# Patient Record
Sex: FEMALE | Race: WHITE | NOT HISPANIC OR LATINO | Employment: FULL TIME
[De-identification: names, ages, dates, MRNs, and addresses within clinical notes are randomized per-mention and may not be internally consistent; named-entity substitution may affect disease eponyms.]

---

## 2018-12-19 ENCOUNTER — TRANSCRIBE ORDERS (OUTPATIENT)
Dept: SCHEDULING | Age: 33
End: 2018-12-19

## 2018-12-19 DIAGNOSIS — N92.6 IRREGULAR MENSTRUATION: Primary | ICD-10-CM

## 2018-12-21 ENCOUNTER — HOSPITAL ENCOUNTER (OUTPATIENT)
Dept: RADIOLOGY | Facility: HOSPITAL | Age: 33
Discharge: HOME | End: 2018-12-21
Attending: OBSTETRICS & GYNECOLOGY
Payer: COMMERCIAL

## 2018-12-21 DIAGNOSIS — N92.6 IRREGULAR MENSTRUATION: ICD-10-CM

## 2018-12-21 PROCEDURE — 76830 TRANSVAGINAL US NON-OB: CPT

## 2019-12-17 ENCOUNTER — APPOINTMENT (OUTPATIENT)
Dept: RADIOLOGY | Age: 34
End: 2019-12-17
Attending: FAMILY MEDICINE
Payer: COMMERCIAL

## 2019-12-17 ENCOUNTER — HOSPITAL ENCOUNTER (OUTPATIENT)
Facility: CLINIC | Age: 34
Discharge: HOME | End: 2019-12-17
Attending: FAMILY MEDICINE
Payer: COMMERCIAL

## 2019-12-17 VITALS
DIASTOLIC BLOOD PRESSURE: 60 MMHG | RESPIRATION RATE: 16 BRPM | TEMPERATURE: 98.9 F | SYSTOLIC BLOOD PRESSURE: 110 MMHG | HEART RATE: 78 BPM

## 2019-12-17 DIAGNOSIS — S80.02XA CONTUSION OF LEFT KNEE, INITIAL ENCOUNTER: Primary | ICD-10-CM

## 2019-12-17 PROCEDURE — S9088 SERVICES PROVIDED IN URGENT: HCPCS | Performed by: FAMILY MEDICINE

## 2019-12-17 PROCEDURE — 99213 OFFICE O/P EST LOW 20 MIN: CPT | Performed by: FAMILY MEDICINE

## 2019-12-17 PROCEDURE — 73564 X-RAY EXAM KNEE 4 OR MORE: CPT | Mod: LT | Performed by: FAMILY MEDICINE

## 2019-12-17 RX ORDER — KETOROLAC TROMETHAMINE 30 MG/ML
30 INJECTION, SOLUTION INTRAMUSCULAR; INTRAVENOUS ONCE
Status: COMPLETED | OUTPATIENT
Start: 2019-12-17 | End: 2019-12-17

## 2019-12-17 RX ADMIN — KETOROLAC TROMETHAMINE 30 MG: 30 INJECTION, SOLUTION INTRAMUSCULAR; INTRAVENOUS at 17:49

## 2019-12-17 NOTE — DISCHARGE INSTRUCTIONS
Your Xray did not reveal any fractures  Your contusion may take up to 1-2 weeks to heal  Use ice for 15-20 minutes to painful area 4-6 times a day to help with swelling and pain  Use Advil 600-800mg every 6-8 hours with food for pain  You receive Ketorolac 30mg IM in the office tonight.   Elevate your leg as much as possible  F/U with PCP as needed if you are not improving

## 2019-12-17 NOTE — ED PROVIDER NOTES
History  No chief complaint on file.    She fell last night onto her knees.  She injured her left knee.  Pain has increased throughout the day.  She is having trouble walking and flexing her knee.  It is better when it is straight.   She took some Advil earlier today  She fractured one of her patellas in 8th grade.  She thinks it might have been the right one but she is not sure.          No past medical history on file.    No past surgical history on file.    No family history on file.    Social History     Tobacco Use   • Smoking status: Not on file   Substance Use Topics   • Alcohol use: Not on file   • Drug use: Not on file       Review of Systems   Musculoskeletal: Positive for gait problem (Due to left knee pain).       Physical Exam  ED Triage Vitals [12/17/19 1726]   Temp Heart Rate Resp BP SpO2   37.2 °C (98.9 °F) 78 16 110/60 --      Temp src Heart Rate Source Patient Position BP Location FiO2 (%) (Set)   -- -- Sitting Left upper arm --       Physical Exam   Constitutional: She appears well-developed and well-nourished. No distress.   Musculoskeletal: She exhibits tenderness. She exhibits no edema or deformity.   Right knee with full nontender ROM.  No pain over patella. Ligaments of right knee all normal  Left knee with 3 inch ecchymotic area over patella and infrapatellar area  Ligament testing of left knee all normal   Area over patella is very tender  No significant joint effusion noted   Psychiatric: She has a normal mood and affect. Her behavior is normal.   Nursing note and vitals reviewed.        Procedures  Procedures    UC Course  Clinical Impressions as of Dec 17 1757   Contusion of left knee, initial encounter       MDM  Number of Diagnoses or Management Options  Contusion of left knee, initial encounter:   Diagnosis management comments: Left knee contusion  Xray negative  Advil, Ice,elevation                 Nestor Cristina MD  12/17/19 6908

## 2021-01-15 ENCOUNTER — IMMUNIZATIONS (OUTPATIENT)
Dept: FAMILY MEDICINE CLINIC | Facility: HOSPITAL | Age: 36
End: 2021-01-15

## 2021-01-15 DIAGNOSIS — Z23 ENCOUNTER FOR IMMUNIZATION: Primary | ICD-10-CM

## 2021-01-15 PROCEDURE — 0001A SARS-COV-2 / COVID-19 MRNA VACCINE (PFIZER-BIONTECH) 30 MCG: CPT

## 2021-01-15 PROCEDURE — 91300 SARS-COV-2 / COVID-19 MRNA VACCINE (PFIZER-BIONTECH) 30 MCG: CPT

## 2021-02-04 ENCOUNTER — IMMUNIZATIONS (OUTPATIENT)
Dept: FAMILY MEDICINE CLINIC | Facility: HOSPITAL | Age: 36
End: 2021-02-04

## 2021-02-04 DIAGNOSIS — Z23 ENCOUNTER FOR IMMUNIZATION: Primary | ICD-10-CM

## 2021-02-04 PROCEDURE — 91300 SARS-COV-2 / COVID-19 MRNA VACCINE (PFIZER-BIONTECH) 30 MCG: CPT

## 2021-02-04 PROCEDURE — 0002A SARS-COV-2 / COVID-19 MRNA VACCINE (PFIZER-BIONTECH) 30 MCG: CPT

## 2022-02-10 ENCOUNTER — ANESTHESIA EVENT (OUTPATIENT)
Dept: OPERATING ROOM | Facility: HOSPITAL | Age: 37
Setting detail: HOSPITAL OUTPATIENT SURGERY
End: 2022-02-10
Payer: COMMERCIAL

## 2022-02-10 ENCOUNTER — HOSPITAL ENCOUNTER (OUTPATIENT)
Dept: PERINATAL CARE | Facility: HOSPITAL | Age: 37
Discharge: HOME | End: 2022-02-10
Attending: NURSE PRACTITIONER
Payer: COMMERCIAL

## 2022-02-10 ENCOUNTER — TRANSCRIBE ORDERS (OUTPATIENT)
Dept: REGISTRATION | Facility: HOSPITAL | Age: 37
End: 2022-02-10

## 2022-02-10 ENCOUNTER — APPOINTMENT (OUTPATIENT)
Dept: PREADMISSION TESTING | Facility: HOSPITAL | Age: 37
End: 2022-02-10
Payer: COMMERCIAL

## 2022-02-10 ENCOUNTER — APPOINTMENT (OUTPATIENT)
Dept: LAB | Facility: HOSPITAL | Age: 37
End: 2022-02-10
Attending: STUDENT IN AN ORGANIZED HEALTH CARE EDUCATION/TRAINING PROGRAM
Payer: COMMERCIAL

## 2022-02-10 VITALS — BODY MASS INDEX: 20.4 KG/M2 | HEIGHT: 67 IN | WEIGHT: 130 LBS

## 2022-02-10 DIAGNOSIS — O02.1 ABORTION, MISSED: Primary | ICD-10-CM

## 2022-02-10 DIAGNOSIS — O02.1 MISSED AB: ICD-10-CM

## 2022-02-10 DIAGNOSIS — O02.1 MISSED AB: Primary | ICD-10-CM

## 2022-02-10 DIAGNOSIS — Z3A.01 LESS THAN 8 WEEKS GESTATION OF PREGNANCY: ICD-10-CM

## 2022-02-10 LAB
ABO + RH BLD: NORMAL
BLD GP AB SCN SERPL QL: NEGATIVE
D AG BLD QL: POSITIVE
ERYTHROCYTE [DISTWIDTH] IN BLOOD BY AUTOMATED COUNT: 11.6 % (ref 11.7–14.4)
HCT VFR BLDCO AUTO: 38.1 % (ref 35–45)
HGB BLD-MCNC: 12.9 G/DL (ref 11.8–15.7)
LABORATORY COMMENT REPORT: NORMAL
MCH RBC QN AUTO: 32.5 PG (ref 28–33.2)
MCHC RBC AUTO-ENTMCNC: 33.9 G/DL (ref 32.2–35.5)
MCV RBC AUTO: 96 FL (ref 83–98)
PDW BLD AUTO: 11.2 FL (ref 9.4–12.3)
PLATELET # BLD AUTO: 194 K/UL (ref 150–369)
RBC # BLD AUTO: 3.97 M/UL (ref 3.93–5.22)
SARS-COV-2 RNA RESP QL NAA+PROBE: NEGATIVE
SPECIMEN EXP DATE BLD: NORMAL
WBC # BLD AUTO: 5.19 K/UL (ref 3.8–10.5)

## 2022-02-10 PROCEDURE — C9803 HOPD COVID-19 SPEC COLLECT: HCPCS

## 2022-02-10 PROCEDURE — 85027 COMPLETE CBC AUTOMATED: CPT

## 2022-02-10 PROCEDURE — U0003 INFECTIOUS AGENT DETECTION BY NUCLEIC ACID (DNA OR RNA); SEVERE ACUTE RESPIRATORY SYNDROME CORONAVIRUS 2 (SARS-COV-2) (CORONAVIRUS DISEASE [COVID-19]), AMPLIFIED PROBE TECHNIQUE, MAKING USE OF HIGH THROUGHPUT TECHNOLOGIES AS DESCRIBED BY CMS-2020-01-R: HCPCS

## 2022-02-10 PROCEDURE — 86850 RBC ANTIBODY SCREEN: CPT

## 2022-02-10 PROCEDURE — 76817 TRANSVAGINAL US OBSTETRIC: CPT

## 2022-02-10 PROCEDURE — 86900 BLOOD TYPING SEROLOGIC ABO: CPT

## 2022-02-10 PROCEDURE — 86901 BLOOD TYPING SEROLOGIC RH(D): CPT

## 2022-02-10 PROCEDURE — 36415 COLL VENOUS BLD VENIPUNCTURE: CPT

## 2022-02-10 ASSESSMENT — PAIN SCALES - GENERAL: PAINLEVEL: 0-NO PAIN

## 2022-02-10 ASSESSMENT — ENCOUNTER SYMPTOMS: HEADACHES: 1

## 2022-02-10 NOTE — ANESTHESIOLOGIST PRE-PROCEDURE CHART REVIEW
I confirm that I have reviewed the available information in the medical record prior to the scheduled surgery. No COVID test on chart

## 2022-02-10 NOTE — ANESTHESIA PREPROCEDURE EVALUATION
Anesthesia ROS/MED HX    Anesthesia History    Previous anesthetics  No history of anesthetic complications  Pulmonary - neg  Neuro/Psych    Headaches   Anxiety  Cardiovascular- neg   Covid19 Test Reviewed  Hematological - neg  GI/Hepatic- neg  Musculoskeletal- neg  Renal Disease- neg  Endo/Other  Body Habitus: Normal      Relevant Problems   No relevant active problems       Physical Exam    Airway   Mallampati: II   TM distance: >3 FB   Neck ROM: full  Cardiovascular - normal   Rhythm: regular   Rate: normalPulmonary - normal   clear to auscultation  Dental - normal        Anesthesia Plan    Plan: general    Technique: general LMA     Lines and Monitors: SACHIN   ASA 2  Anesthetic plan and risks discussed with: patient  Induction:    intravenous   Postop Plan:   Patient Disposition: phase II then home   Pain Management: IV analgesics

## 2022-02-10 NOTE — PRE-PROCEDURE INSTRUCTIONS
1. Admissions will call you with your arrival time on 02/10/2022 (day prior to surgery) between 2pm - 4pm. For questions about your arrival time, please call [phone].    2. Please report to the [place] campus in the [facility] on the day of your procedure. Enter the hospital through the [facility] (main entrance at [address], [place]). If you are parking in the [facility] [place], come to the ground floor of the garage and follow signs to the Main Hospital. Bring your insurance card and photo ID.     3. Please follow these fasting guidelines:  - STOP all solid food 8 hours prior to arrival.   - No more than 12oz of water is permitted and must STOP 2 HOURS prior to arrival to the hospital.     4. Early on the morning of the procedure, please take the following medications listed below with a sip of water, in addition to any medications prescribed by your surgeon:     *NO aspirin, ibuprofen, anti-inflammatories, fish oil or Vitamin E unless ordered by physician.    *Stop taking      5. Other instructions: antibacterial shower x 2, brush teeth    6. If you develop a cough, cold, fever, or other symptom prior to the date of the procedure, please report it to your physician immediately.    7. If you need to cancel the procedure for any reason, please contact your physician.    8. Make arrangements to have someone drive you home from the procedure. If you have not arranged for transportation home, your surgery may be cancelled.     9. You may not take public transportation or a cab unless accompanied by a responsible person.    10. You may not drive a car or operate complex or potentially dangerous machinery for 24 hours following anesthesia and/or sedation.    11. If it is medically necessary for you to have a longer stay, you will be informed as soon as the decision is made.    12. Do not wear or bring anything of value to the hospital, including medication or jewelry of any kind. Do  not wear make-up or contact lenses. Do bring your glasses and hearing aid, with a case. If you use a CPAP machine and will be overnight, please bring it with you. If you use any inhalers, please bring them as well.     13. Dress in comfortable clothes.    14. If instructed, please bring a copy of your Advance Directive (Living Will/Durable Power of ) on the day of your procedure.    15. Ensuring your safety at all times is a very important part of our [facility] Culture of Safety. After having surgery and sedation, you are at risk for falling and balance issues. Although you may feel awake, the effects of the medication can last up to 24 hours after anesthesia. If you need to use the bathroom during your recovery period, nursing staff will escort you there and stay with you to ensure your safety.    Pre operative instructions given as per protocol.  Form explained by:     Bereavement phone number offered to pt.  Pt refused Bereavement phone number.

## 2022-02-11 ENCOUNTER — HOSPITAL ENCOUNTER (OUTPATIENT)
Facility: HOSPITAL | Age: 37
Setting detail: HOSPITAL OUTPATIENT SURGERY
Discharge: HOME | End: 2022-02-11
Attending: OBSTETRICS & GYNECOLOGY | Admitting: OBSTETRICS & GYNECOLOGY
Payer: COMMERCIAL

## 2022-02-11 ENCOUNTER — ANESTHESIA (OUTPATIENT)
Dept: OPERATING ROOM | Facility: HOSPITAL | Age: 37
Setting detail: HOSPITAL OUTPATIENT SURGERY
End: 2022-02-11
Payer: COMMERCIAL

## 2022-02-11 VITALS
SYSTOLIC BLOOD PRESSURE: 100 MMHG | DIASTOLIC BLOOD PRESSURE: 57 MMHG | OXYGEN SATURATION: 100 % | HEART RATE: 57 BPM | TEMPERATURE: 99 F | RESPIRATION RATE: 16 BRPM

## 2022-02-11 DIAGNOSIS — O02.1 MISSED AB: ICD-10-CM

## 2022-02-11 PROCEDURE — 37000001 HC ANESTHESIA GENERAL: Performed by: OBSTETRICS & GYNECOLOGY

## 2022-02-11 PROCEDURE — 71000002 HC PACU PHASE 2 INITIAL 30MIN: Performed by: OBSTETRICS & GYNECOLOGY

## 2022-02-11 PROCEDURE — 71000012 HC PACU PHASE 2 EA ADDL MIN: Performed by: OBSTETRICS & GYNECOLOGY

## 2022-02-11 PROCEDURE — 88305 TISSUE EXAM BY PATHOLOGIST: CPT | Performed by: OBSTETRICS & GYNECOLOGY

## 2022-02-11 PROCEDURE — 25800000 HC PHARMACY IV SOLUTIONS: Performed by: NURSE ANESTHETIST, CERTIFIED REGISTERED

## 2022-02-11 PROCEDURE — 36000002 HC OR LEVEL 2 INITIAL 30MIN: Performed by: OBSTETRICS & GYNECOLOGY

## 2022-02-11 PROCEDURE — 71000001 HC PACU PHASE 1 INITIAL 30MIN: Performed by: OBSTETRICS & GYNECOLOGY

## 2022-02-11 PROCEDURE — 63600000 HC DRUGS/DETAIL CODE: Performed by: NURSE ANESTHETIST, CERTIFIED REGISTERED

## 2022-02-11 PROCEDURE — 71000011 HC PACU PHASE 1 EA ADDL MIN: Performed by: OBSTETRICS & GYNECOLOGY

## 2022-02-11 PROCEDURE — 63600000 HC DRUGS/DETAIL CODE: Performed by: ANESTHESIOLOGY

## 2022-02-11 PROCEDURE — 25000000 HC PHARMACY GENERAL: Performed by: NURSE ANESTHETIST, CERTIFIED REGISTERED

## 2022-02-11 PROCEDURE — 10D17ZZ EXTRACTION OF PRODUCTS OF CONCEPTION, RETAINED, VIA NATURAL OR ARTIFICIAL OPENING: ICD-10-PCS | Performed by: OBSTETRICS & GYNECOLOGY

## 2022-02-11 RX ORDER — OXYCODONE HYDROCHLORIDE 5 MG/1
5 TABLET ORAL EVERY 4 HOURS PRN
Status: CANCELLED | OUTPATIENT
Start: 2022-02-11

## 2022-02-11 RX ORDER — LABETALOL HCL 20 MG/4 ML
5 SYRINGE (ML) INTRAVENOUS AS NEEDED
Status: DISCONTINUED | OUTPATIENT
Start: 2022-02-11 | End: 2022-02-11 | Stop reason: HOSPADM

## 2022-02-11 RX ORDER — FENTANYL CITRATE 50 UG/ML
50 INJECTION, SOLUTION INTRAMUSCULAR; INTRAVENOUS
Status: DISCONTINUED | OUTPATIENT
Start: 2022-02-11 | End: 2022-02-11 | Stop reason: HOSPADM

## 2022-02-11 RX ORDER — SODIUM CHLORIDE 9 MG/ML
INJECTION, SOLUTION INTRAVENOUS CONTINUOUS PRN
Status: DISCONTINUED | OUTPATIENT
Start: 2022-02-11 | End: 2022-02-11 | Stop reason: SURG

## 2022-02-11 RX ORDER — DOXYCYCLINE HYCLATE 100 MG
200 TABLET ORAL ONCE
Status: COMPLETED | OUTPATIENT
Start: 2022-02-11 | End: 2022-02-11

## 2022-02-11 RX ORDER — ONDANSETRON HYDROCHLORIDE 2 MG/ML
4 INJECTION, SOLUTION INTRAVENOUS
Status: DISCONTINUED | OUTPATIENT
Start: 2022-02-11 | End: 2022-02-11 | Stop reason: HOSPADM

## 2022-02-11 RX ORDER — DEXTROSE 50 % IN WATER (D50W) INTRAVENOUS SYRINGE
25 AS NEEDED
Status: CANCELLED | OUTPATIENT
Start: 2022-02-11

## 2022-02-11 RX ORDER — DEXTROSE 40 %
15-30 GEL (GRAM) ORAL AS NEEDED
Status: CANCELLED | OUTPATIENT
Start: 2022-02-11

## 2022-02-11 RX ORDER — PROPOFOL 10 MG/ML
INJECTION, EMULSION INTRAVENOUS CONTINUOUS PRN
Status: DISCONTINUED | OUTPATIENT
Start: 2022-02-11 | End: 2022-02-11 | Stop reason: SURG

## 2022-02-11 RX ORDER — FENTANYL CITRATE 50 UG/ML
INJECTION, SOLUTION INTRAMUSCULAR; INTRAVENOUS AS NEEDED
Status: DISCONTINUED | OUTPATIENT
Start: 2022-02-11 | End: 2022-02-11 | Stop reason: SURG

## 2022-02-11 RX ORDER — ONDANSETRON 4 MG/1
4 TABLET, ORALLY DISINTEGRATING ORAL EVERY 8 HOURS PRN
Status: CANCELLED | OUTPATIENT
Start: 2022-02-11 | End: 2022-05-12

## 2022-02-11 RX ORDER — IBUPROFEN 400 MG/1
400 TABLET ORAL EVERY 6 HOURS PRN
Status: CANCELLED | OUTPATIENT
Start: 2022-02-11

## 2022-02-11 RX ORDER — IBUPROFEN 200 MG
16-32 TABLET ORAL AS NEEDED
Status: CANCELLED | OUTPATIENT
Start: 2022-02-11

## 2022-02-11 RX ORDER — ONDANSETRON HYDROCHLORIDE 2 MG/ML
INJECTION, SOLUTION INTRAVENOUS AS NEEDED
Status: DISCONTINUED | OUTPATIENT
Start: 2022-02-11 | End: 2022-02-11 | Stop reason: SURG

## 2022-02-11 RX ORDER — KETOROLAC TROMETHAMINE 15 MG/ML
INJECTION, SOLUTION INTRAMUSCULAR; INTRAVENOUS AS NEEDED
Status: DISCONTINUED | OUTPATIENT
Start: 2022-02-11 | End: 2022-02-11 | Stop reason: SURG

## 2022-02-11 RX ORDER — HYDROMORPHONE HYDROCHLORIDE 1 MG/ML
0.5 INJECTION, SOLUTION INTRAMUSCULAR; INTRAVENOUS; SUBCUTANEOUS
Status: DISCONTINUED | OUTPATIENT
Start: 2022-02-11 | End: 2022-02-11 | Stop reason: HOSPADM

## 2022-02-11 RX ORDER — LIDOCAINE HYDROCHLORIDE 10 MG/ML
INJECTION, SOLUTION EPIDURAL; INFILTRATION; INTRACAUDAL; PERINEURAL AS NEEDED
Status: DISCONTINUED | OUTPATIENT
Start: 2022-02-11 | End: 2022-02-11 | Stop reason: SURG

## 2022-02-11 RX ORDER — MIDAZOLAM HYDROCHLORIDE 2 MG/2ML
INJECTION, SOLUTION INTRAMUSCULAR; INTRAVENOUS AS NEEDED
Status: DISCONTINUED | OUTPATIENT
Start: 2022-02-11 | End: 2022-02-11 | Stop reason: SURG

## 2022-02-11 RX ADMIN — KETOROLAC TROMETHAMINE 15 MG: 15 INJECTION, SOLUTION INTRAMUSCULAR; INTRAVENOUS at 11:10

## 2022-02-11 RX ADMIN — FENTANYL CITRATE 100 MCG: 50 INJECTION, SOLUTION INTRAMUSCULAR; INTRAVENOUS at 10:57

## 2022-02-11 RX ADMIN — MIDAZOLAM HYDROCHLORIDE 2 MG: 1 INJECTION, SOLUTION INTRAMUSCULAR; INTRAVENOUS at 10:50

## 2022-02-11 RX ADMIN — Medication 200 MG: at 12:31

## 2022-02-11 RX ADMIN — PROPOFOL 150 MCG/KG/MIN: 10 INJECTION, EMULSION INTRAVENOUS at 10:57

## 2022-02-11 RX ADMIN — LIDOCAINE HYDROCHLORIDE 2 ML: 10 INJECTION, SOLUTION EPIDURAL; INFILTRATION; INTRACAUDAL; PERINEURAL at 10:57

## 2022-02-11 RX ADMIN — ONDANSETRON 4 MG: 2 INJECTION INTRAMUSCULAR; INTRAVENOUS at 11:05

## 2022-02-11 RX ADMIN — SODIUM CHLORIDE: 0.9 INJECTION, SOLUTION INTRAVENOUS at 10:53

## 2022-02-11 RX ADMIN — FENTANYL CITRATE 50 MCG: 50 INJECTION INTRAMUSCULAR; INTRAVENOUS at 11:28

## 2022-02-11 ASSESSMENT — PAIN SCALES - GENERAL: PAIN_LEVEL: 2

## 2022-02-11 NOTE — H&P
Gynecology History and Physical    HPI     Patient is a 36 y.o. female who presents with embryo demise at 7 weeks, confirmed by ultrasound..     OB History:   OB History    Para Term  AB Living   1 0 0 0 0 0   SAB IAB Ectopic Multiple Live Births   0 0 0 0 0      # Outcome Date GA Lbr Luis Alberto/2nd Weight Sex Delivery Anes PTL Lv   1 Current                Medical History:   Past Medical History:   Diagnosis Date   • Anxiety    • H/O migraine    • UTI (urinary tract infection)        Surgical History:   Past Surgical History:   Procedure Laterality Date   • DILATION AND CURETTAGE, DIAGNOSTIC / THERAPEUTIC     • WISDOM TOOTH EXTRACTION         Social History:   Social History     Social History Narrative   • Not on file       Family History: No family history on file.    Allergies: Patient has no known allergies.    Prior to Admission medications    Not on File       Review of Systems  Pertinent items are noted in HPI.    Objective     Vital Signs for the last 24 hours:       Exam  General appearance: alert, appears stated age and cooperative  Lungs: clear to auscultation bilaterally  Heart: regular rate and rhythm, S1, S2 normal, no murmur, click, rub or gallop  Abdomen: soft, non-tender; bowel sounds normal; no masses, no organomegaly    Labs  I have reviewed the patient's labs.  Current labs are within normal limits.    Imaging  I have reviewed the Imaging from the last 24 hrs.        Assessment/Plan     Code Status: No Order    Missed  at 7 weeks  Consent for D&E    Sergei Black MD.

## 2022-02-11 NOTE — ANESTHESIOLOGIST PRE-PROCEDURE ATTESTATION
Pre-Procedure Patient Identification:  I am the Primary Anesthesiologist and have identified the patient on 02/11/22 at 10:32 AM.   I have confirmed the procedure(s) will be performed by the following surgeon/proceduralist Sergei Black MD.

## 2022-02-11 NOTE — OP NOTE
D&E Procedure Note     Name: Celine Engle MRN [id number]   YOB: 1985   Date: 2022   Time: 12:15 PM   Pre-operative Diagnosis: missed    Post-operatIve Diagnosis: same     Surgeon(s) and Role:     * Sergei Black MD - Primary     Anesthesiologist: No responsible provider has been recorded for the case.   Anesthesia: General     Procedure: dilation and evacuation  Estimated Blood Loss: Minimal   Complications: none  Indication: missed  at 7 weeks   Findings: anteverted uterus, sound to 10 cm, moderate amount of tissue  Specimens:   ID Type Source Tests Collected by Time Destination   1 : products of conception Products of Conception Products of Conception PATHOLOGY TISSUE EXAM Sergei Black MD 2022 1059        Procedure in Detail:   The patient was taken to the operating room with her IV fluids running. The patient was identified and a timeout was performed. She was placed under general  anesthesia. She was positioned in dorsal lithotomy position. She was then prepped and draped in the normal sterile fashion. The anterior lip of the cervix was grasped with a single-tooth tenaculum, and the cervix was dilated . A 7mm straight curette was inserted into uterine cavity and evacuation was performed with moderate amount of tissue obtained. A medium sharp curettage was performed with no additional tissue.   Sponge, needle, and instrument counts were correct times two.    Disposition: PACU - hemodynamically stable.  Condition: stable    Attending Attestation: I performed the procedure.    Sergei Black MD

## 2022-02-11 NOTE — ANESTHESIA POSTPROCEDURE EVALUATION
Patient: Celine Engle    Procedure Summary     Date: 02/11/22 Room / Location: [facility] PAV OR 06 / [facility] OR [facility]    Anesthesia Start: 1053 Anesthesia Stop: 1121    Procedure: D&E 6W (N/A Uterus) Diagnosis:       Missed ab      (Missed ab [O02.1])    Surgeons: Sergei Black MD Responsible Provider: Tal Nuñez MD    Anesthesia Type: general ASA Status: 2          Anesthesia Type: general  PACU Vitals  2/11/2022 1114 - 2/11/2022 1128      2/11/2022  1123 2/11/2022  1125          BP: -- 95/54      Temp: 36.6 °C (97.9 °F) --      Pulse: -- 57      Resp: -- 15              Anesthesia Post Evaluation    Pain score: 2  Pain management: satisfactory to patient  Mode of pain management: IV medication  Patient location during evaluation: PACU  Patient participation: complete - patient participated  Level of consciousness: awake  Cardiovascular status: acceptable  Airway Patency: adequate  Respiratory status: acceptable  Hydration status: stable  Anesthetic complications: no

## 2022-02-14 LAB
CASE RPRT: NORMAL
CLINICAL INFO: NORMAL
PATH REPORT.FINAL DX SPEC: NORMAL
PATH REPORT.GROSS SPEC: NORMAL

## 2022-02-19 ENCOUNTER — APPOINTMENT (EMERGENCY)
Dept: RADIOLOGY | Facility: HOSPITAL | Age: 37
End: 2022-02-19
Attending: EMERGENCY MEDICINE
Payer: COMMERCIAL

## 2022-02-19 ENCOUNTER — HOSPITAL ENCOUNTER (EMERGENCY)
Facility: HOSPITAL | Age: 37
Discharge: HOME | End: 2022-02-19
Attending: EMERGENCY MEDICINE
Payer: COMMERCIAL

## 2022-02-19 VITALS
DIASTOLIC BLOOD PRESSURE: 64 MMHG | BODY MASS INDEX: 20.4 KG/M2 | WEIGHT: 130 LBS | OXYGEN SATURATION: 100 % | TEMPERATURE: 98.8 F | HEIGHT: 67 IN | RESPIRATION RATE: 16 BRPM | SYSTOLIC BLOOD PRESSURE: 131 MMHG | HEART RATE: 79 BPM

## 2022-02-19 DIAGNOSIS — S30.0XXA CONTUSION OF COCCYX, INITIAL ENCOUNTER: Primary | ICD-10-CM

## 2022-02-19 LAB — B-HCG UR QL: POSITIVE

## 2022-02-19 PROCEDURE — 81025 URINE PREGNANCY TEST: CPT | Performed by: EMERGENCY MEDICINE

## 2022-02-19 PROCEDURE — 72220 X-RAY EXAM SACRUM TAILBONE: CPT

## 2022-02-19 PROCEDURE — 63700000 HC SELF-ADMINISTRABLE DRUG: Performed by: PHYSICIAN ASSISTANT

## 2022-02-19 PROCEDURE — 99283 EMERGENCY DEPT VISIT LOW MDM: CPT | Mod: 25

## 2022-02-19 RX ORDER — OXYCODONE AND ACETAMINOPHEN 5; 325 MG/1; MG/1
1 TABLET ORAL EVERY 6 HOURS PRN
Qty: 5 TABLET | Refills: 0 | Status: SHIPPED | OUTPATIENT
Start: 2022-02-19 | End: 2022-02-19

## 2022-02-19 RX ORDER — IBUPROFEN 600 MG/1
600 TABLET ORAL ONCE
Status: COMPLETED | OUTPATIENT
Start: 2022-02-19 | End: 2022-02-19

## 2022-02-19 RX ORDER — OXYCODONE AND ACETAMINOPHEN 5; 325 MG/1; MG/1
1 TABLET ORAL EVERY 4 HOURS PRN
Qty: 8 TABLET | Refills: 0 | Status: SHIPPED | OUTPATIENT
Start: 2022-02-19 | End: 2022-03-01

## 2022-02-19 RX ADMIN — IBUPROFEN 600 MG: 600 TABLET ORAL at 18:16

## 2022-02-19 ASSESSMENT — ENCOUNTER SYMPTOMS
ABDOMINAL PAIN: 0
HEADACHES: 0
BACK PAIN: 1
DIZZINESS: 0
NECK PAIN: 0

## 2022-02-19 NOTE — ED PROVIDER NOTES
Emergency Medicine Note  HPI   HISTORY OF PRESENT ILLNESS     36 year old female presents to the ER for evaluation. Pt fell while skiing today and landed on her tailbone. She has shooting pain when she sits down. Feels better when standing. No other injury sustained. She says she has a h/o sacral fx in the past and this feels similar. Pt took pain meds prior to arrival. No head neck chest abdominal or extremity trauma.       Trauma  Mechanism of injury: fall     Current symptoms:       Associated symptoms:             Reports back pain.             Denies abdominal pain, chest pain, headache and neck pain.   Back Pain  Associated symptoms: no abdominal pain, no chest pain and no headaches          Patient History   PAST HISTORY     Reviewed from Nursing Triage:       Past Medical History:   Diagnosis Date   • Anxiety    • H/O migraine    • UTI (urinary tract infection)        Past Surgical History:   Procedure Laterality Date   • DILATION AND CURETTAGE, DIAGNOSTIC / THERAPEUTIC     • WISDOM TOOTH EXTRACTION         History reviewed. No pertinent family history.    Social History     Tobacco Use   • Smoking status: Never Smoker   • Smokeless tobacco: Never Used   Vaping Use   • Vaping Use: Never used   Substance Use Topics   • Alcohol use: Not Currently   • Drug use: Not on file         Review of Systems   REVIEW OF SYSTEMS     Review of Systems   Cardiovascular: Negative for chest pain.   Gastrointestinal: Negative for abdominal pain.   Musculoskeletal: Positive for back pain. Negative for neck pain.   Neurological: Negative for dizziness and headaches.   All other systems reviewed and are negative.        VITALS     ED Vitals    Date/Time Temp Pulse Resp BP SpO2 [facility]   02/19/22 1516 37.1 °C (98.8 °F) 79 16 131/64 100 % GFC        Pulse Ox %: 100 % (02/19/22 1748)  Pulse Ox Interpretation: Normal (02/19/22 1748)           Physical Exam   PHYSICAL EXAM     Physical Exam  Constitutional:       General: She is not in  acute distress.     Appearance: Normal appearance. She is not ill-appearing, toxic-appearing or diaphoretic.   Pulmonary:      Effort: Pulmonary effort is normal.   Musculoskeletal:      Cervical back: Normal range of motion and neck supple.      Comments: Tenderness over sacrum   Skin:     General: Skin is warm and dry.   Neurological:      General: No focal deficit present.      Mental Status: She is alert and oriented to person, place, and time.           PROCEDURES     Procedures     DATA     Results     Procedure Component Value Units Date/Time    Urine, pregnancy (lab) [[id number]]  (Abnormal) Collected: 02/19/22 1540    Specimen: Urine, Clean Catch Updated: 02/19/22 1559     BHCG, Qualitative Urine Positive          Imaging Results          X-RAY SACRUM AND COCCYX (Final result)  Result time 02/19/22 17:30:40    Final result                 Impression:    IMPRESSION:    No acute osseous abnormality.             Narrative:    CLINICAL HISTORY: fall   .    TECHNIQUE: XR SACRUM AND COCCYX    COMMENT:    No evidence of acute fracture or dislocation. Soft tissues are unremarkable.                                  No orders to display       Scoring tools                                 ED Course & MDM   MDM / ED COURSE / CLINICAL IMPRESSIONS / DISPO     [facility]    ED Course as of 02/19/22 2009   Sat Feb 19, 2022   1748 Patient with positive pregnancy test, had a D and E last week []      ED Course User Index  [BH] Harjit Castro, DO         Clinical Impressions as of 02/19/22 2009   Contusion of coccyx, initial encounter              Paula Shrestha PA C  02/19/22 2009

## 2022-02-20 NOTE — ED ATTESTATION NOTE
I have personally seen and examined the patient.  I reviewed and agree with physician assistant / nurse practitioner’s assessment and plan of care, with the following exceptions: None  My examination, assessment, and plan of care of Celine Engle is as follows:     Exam: Well-appearing, no acute distress, awake alert oriented x3, patient able to ambulate without issues, tenderness to palpation to lower sacral area    Assessment and plan: Patient mechanical fall coming off a ski lift today, x-ray unremarkable, symptomatic treatment and outpatient follow-up     Harjit Castro, DO  02/20/22 0121

## 2022-04-06 ENCOUNTER — TELEPHONE (OUTPATIENT)
Dept: OBSTETRICS AND GYNECOLOGY | Facility: HOSPITAL | Age: 37
End: 2022-04-06
Payer: COMMERCIAL

## 2022-04-06 RX ORDER — NITROFURANTOIN 25; 75 MG/1; MG/1
100 CAPSULE ORAL 2 TIMES DAILY
Qty: 14 CAPSULE | Refills: 0 | Status: SHIPPED | OUTPATIENT
Start: 2022-04-06 | End: 2022-04-13

## 2022-04-14 ENCOUNTER — TRANSCRIBE ORDERS (OUTPATIENT)
Dept: SCHEDULING | Age: 37
End: 2022-04-14

## 2022-04-14 DIAGNOSIS — R39.15 URGENCY OF URINATION: Primary | ICD-10-CM

## 2022-04-26 ENCOUNTER — HOSPITAL ENCOUNTER (OUTPATIENT)
Dept: RADIOLOGY | Facility: CLINIC | Age: 37
Discharge: HOME | End: 2022-04-26
Attending: UROLOGY
Payer: COMMERCIAL

## 2022-04-26 DIAGNOSIS — R39.15 URGENCY OF URINATION: ICD-10-CM

## 2022-04-26 PROCEDURE — 76770 US EXAM ABDO BACK WALL COMP: CPT

## 2022-06-14 ENCOUNTER — TRANSCRIBE ORDERS (OUTPATIENT)
Dept: SCHEDULING | Age: 37
End: 2022-06-14

## 2022-06-14 DIAGNOSIS — R10.2 PELVIC AND PERINEAL PAIN: Primary | ICD-10-CM

## 2023-02-28 ENCOUNTER — INITIAL PRENATAL (OUTPATIENT)
Dept: OBSTETRICS AND GYNECOLOGY | Facility: CLINIC | Age: 38
End: 2023-02-28
Payer: COMMERCIAL

## 2023-02-28 VITALS
WEIGHT: 127 LBS | BODY MASS INDEX: 19.93 KG/M2 | DIASTOLIC BLOOD PRESSURE: 60 MMHG | SYSTOLIC BLOOD PRESSURE: 100 MMHG | HEIGHT: 67 IN

## 2023-02-28 DIAGNOSIS — Z34.01 ENCOUNTER FOR SUPERVISION OF NORMAL FIRST PREGNANCY IN FIRST TRIMESTER: Primary | ICD-10-CM

## 2023-02-28 PROCEDURE — 99203 OFFICE O/P NEW LOW 30 MIN: CPT | Performed by: OBSTETRICS & GYNECOLOGY

## 2023-02-28 PROCEDURE — 76815 OB US LIMITED FETUS(S): CPT | Performed by: OBSTETRICS & GYNECOLOGY

## 2023-03-07 ENCOUNTER — TELEPHONE (OUTPATIENT)
Dept: OBSTETRICS AND GYNECOLOGY | Facility: CLINIC | Age: 38
End: 2023-03-07
Payer: COMMERCIAL

## 2023-03-08 LAB
ABO GROUP BLD: NORMAL
BASOPHILS # BLD AUTO: 0 X10E3/UL (ref 0–0.2)
BASOPHILS NFR BLD AUTO: 0 %
BLD GP AB SCN SERPL QL: NEGATIVE
EOSINOPHIL # BLD AUTO: 0.1 X10E3/UL (ref 0–0.4)
EOSINOPHIL NFR BLD AUTO: 1 %
ERYTHROCYTE [DISTWIDTH] IN BLOOD BY AUTOMATED COUNT: 12.2 % (ref 11.7–15.4)
HCT VFR BLD AUTO: 36.5 % (ref 34–46.6)
HGB BLD-MCNC: 12.6 G/DL (ref 11.1–15.9)
IMM GRANULOCYTES # BLD AUTO: 0 X10E3/UL (ref 0–0.1)
IMM GRANULOCYTES NFR BLD AUTO: 0 %
LYMPHOCYTES # BLD AUTO: 1.6 X10E3/UL (ref 0.7–3.1)
LYMPHOCYTES NFR BLD AUTO: 27 %
MCH RBC QN AUTO: 31.6 PG (ref 26.6–33)
MCHC RBC AUTO-ENTMCNC: 34.5 G/DL (ref 31.5–35.7)
MCV RBC AUTO: 92 FL (ref 79–97)
MONOCYTES # BLD AUTO: 0.5 X10E3/UL (ref 0.1–0.9)
MONOCYTES NFR BLD AUTO: 8 %
NEUTROPHILS # BLD AUTO: 3.6 X10E3/UL (ref 1.4–7)
NEUTROPHILS NFR BLD AUTO: 64 %
PLATELET # BLD AUTO: 223 X10E3/UL (ref 150–450)
PROGEST SERPL-MCNC: 27.8 NG/ML
RBC # BLD AUTO: 3.99 X10E6/UL (ref 3.77–5.28)
RH BLD: POSITIVE
RUBV IGG SERPL IA-ACNC: 5.17 INDEX
T4 FREE SERPL-MCNC: 1.32 NG/DL (ref 0.82–1.77)
TREPONEMA PALLIDUM IGG+IGM AB [PRESENCE] IN SERUM OR PLASMA BY IMMUNOASSAY: NON REACTIVE
TSH SERPL DL<=0.005 MIU/L-ACNC: 0.15 UIU/ML (ref 0.45–4.5)
WBC # BLD AUTO: 5.8 X10E3/UL (ref 3.4–10.8)

## 2023-03-09 LAB
T4 SERPL-MCNC: 9.2 UG/DL (ref 4.5–12)
THYROPEROXIDASE AB SERPL-ACNC: <9 IU/ML (ref 0–34)

## 2023-03-10 LAB
SPECIMEN STATUS: NORMAL
TSH RECEP AB SER-ACNC: <1.1 IU/L (ref 0–1.75)

## 2023-03-12 LAB
CFDNA.FET/CFDNA.TOTAL SFR FETUS: NORMAL %
CITATION REF LAB TEST: NORMAL
FET 13+18+21+X+Y ANEUP PLAS.CFDNA: NEGATIVE
FET CHR 21 TS PLAS.CFDNA QL: NEGATIVE
FET SEX PLAS.CFDNA DOSAGE CFDNA: NORMAL
FET TS 13 RISK PLAS.CFDNA QL: NEGATIVE
FET TS 18 RISK WBC.DNA+CFDNA QL: NEGATIVE
GA EST FROM CONCEPTION DATE: NORMAL D
LAB CORP GESTATIONAL AGE: YES
LAB CORP POSITIVE PREDICTIVE VALUE: NORMAL
LAB CORP TEST LIMITATIONS: NORMAL
LAB DIRECTOR NAME PROVIDER: NORMAL
LAB DIRECTOR NAME PROVIDER: NORMAL
LABCORP NEGATIVE PREDICTIVE VALUE: NORMAL
LABCORP PERFORMANCE CHARACTERISTICS: NORMAL
LABORATORY COMMENT REPORT: NORMAL
Lab: NORMAL
REF LAB TEST METHOD: NORMAL
TEST PERFORMANCE INFO SPEC: NORMAL

## 2023-03-14 ENCOUNTER — TELEPHONE (OUTPATIENT)
Dept: OBSTETRICS AND GYNECOLOGY | Facility: CLINIC | Age: 38
End: 2023-03-14
Payer: COMMERCIAL

## 2023-03-14 LAB
CFTR MUT ANL BLD/T: NORMAL
LABORATORY COMMENT REPORT: NORMAL

## 2023-03-15 ENCOUNTER — ROUTINE PRENATAL (OUTPATIENT)
Dept: OBSTETRICS AND GYNECOLOGY | Facility: CLINIC | Age: 38
End: 2023-03-15
Payer: COMMERCIAL

## 2023-03-15 VITALS — DIASTOLIC BLOOD PRESSURE: 76 MMHG | SYSTOLIC BLOOD PRESSURE: 122 MMHG | WEIGHT: 129.2 LBS | BODY MASS INDEX: 20.24 KG/M2

## 2023-03-15 DIAGNOSIS — Z34.02 ENCOUNTER FOR SUPERVISION OF NORMAL FIRST PREGNANCY IN SECOND TRIMESTER: Primary | ICD-10-CM

## 2023-03-15 LAB
BLOOD URINE, POC: ABNORMAL
EXPIRATION DATE: ABNORMAL
GLUCOSE URINE, POC: NEGATIVE
KETONES, POC: NEGATIVE
LEUKOCYTE EST, POC: ABNORMAL
Lab: ABNORMAL
NITRITE, POC: NEGATIVE
POCT MANUFACTURER: ABNORMAL
PROTEIN, POC: NEGATIVE

## 2023-03-15 PROCEDURE — 0502F SUBSEQUENT PRENATAL CARE: CPT | Performed by: OBSTETRICS & GYNECOLOGY

## 2023-03-15 PROCEDURE — 81002 URINALYSIS NONAUTO W/O SCOPE: CPT | Performed by: OBSTETRICS & GYNECOLOGY

## 2023-03-15 PROCEDURE — 76815 OB US LIMITED FETUS(S): CPT | Performed by: OBSTETRICS & GYNECOLOGY

## 2023-03-15 NOTE — PRENATAL NOTE
Destiney is here today for her second OB visit 37-year-old  4 para 3 1 spontaneous miscarriage following 3 vaginal births on 3 boys her NIPT came back confirming normal female chromosomes her ultrasound today confirmed an intrauterine pregnancy healthy beating heart 156 bpm she had a crown-rump length compatible with dates good fetal movement good limb but development baby measured to be 11 weeks and 5 days 4.86 cm posterior placenta long closed cervix healthy started this pregnancy should be back in 4 weeks we will do an alpha-fetoprotein she is going in the meantime schedule her ultrasound she did have an over suppressed TSH normal T4 she has subclinical hypothyroidism most likely related to her pregnancy she has had many normal TSHs prior to this pregnancy we will check her TSH following her delivery

## 2023-03-16 ENCOUNTER — CLINICAL SUPPORT (OUTPATIENT)
Dept: OBSTETRICS AND GYNECOLOGY | Facility: CLINIC | Age: 38
End: 2023-03-16
Payer: COMMERCIAL

## 2023-03-16 DIAGNOSIS — N30.00 ACUTE CYSTITIS WITHOUT HEMATURIA: ICD-10-CM

## 2023-03-16 DIAGNOSIS — N30.01 ACUTE CYSTITIS WITH HEMATURIA: Primary | ICD-10-CM

## 2023-03-16 PROCEDURE — 99212 OFFICE O/P EST SF 10 MIN: CPT | Performed by: OBSTETRICS & GYNECOLOGY

## 2023-03-16 PROCEDURE — 81002 URINALYSIS NONAUTO W/O SCOPE: CPT | Performed by: OBSTETRICS & GYNECOLOGY

## 2023-03-16 NOTE — PROGRESS NOTES
Patient was seen yesterday in office for OB visit confirm some microscopic urine findings she does report complaints of urinary tract infection today we will send off a UA CNS patient is in agreement

## 2023-03-17 LAB
APPEARANCE UR: CLEAR
BACTERIA #/AREA URNS HPF: NORMAL /[HPF]
BACTERIA UR CULT: NO GROWTH
BACTERIA UR CULT: NORMAL
BILIRUB UR QL STRIP: NEGATIVE
CASTS URNS QL MICRO: NORMAL /LPF
COLOR UR: YELLOW
EPI CELLS #/AREA URNS HPF: NORMAL /HPF (ref 0–10)
GLUCOSE UR QL STRIP: NEGATIVE
HGB UR QL STRIP: NEGATIVE
KETONES UR QL STRIP: NEGATIVE
LEUKOCYTE ESTERASE UR QL STRIP: NEGATIVE
MICRO URNS: NORMAL
MICRO URNS: NORMAL
NITRITE UR QL STRIP: NEGATIVE
PH UR STRIP: 6 [PH] (ref 5–7.5)
PROT UR QL STRIP: NEGATIVE
RBC #/AREA URNS HPF: NORMAL /HPF (ref 0–2)
SP GR UR STRIP: 1.02 (ref 1–1.03)
UROBILINOGEN UR STRIP-MCNC: 0.2 MG/DL (ref 0.2–1)
WBC #/AREA URNS HPF: NORMAL /HPF (ref 0–5)

## 2023-03-20 LAB
CLINICAL GENETICS COUNSELING NOTE: NORMAL
CLINICAL INFO: NORMAL
ETHNIC BACKGROUND STATED: NORMAL
LAB DIRECTOR NAME PROVIDER: NORMAL
LABCORP SMN1 INDICATION:: NORMAL
LABORATORY COMMENT REPORT: NORMAL
REF LAB TEST METHOD: NORMAL
SMN1 GENE MUT ANL BLD/T: NORMAL
SPECIMEN SOURCE: NORMAL
TEST PERFORMANCE INFO SPEC: NORMAL
TEST PERFORMANCE INFO SPEC: NORMAL

## 2023-04-12 ENCOUNTER — ROUTINE PRENATAL (OUTPATIENT)
Dept: OBSTETRICS AND GYNECOLOGY | Facility: CLINIC | Age: 38
End: 2023-04-12
Payer: COMMERCIAL

## 2023-04-12 VITALS — SYSTOLIC BLOOD PRESSURE: 124 MMHG | BODY MASS INDEX: 20.8 KG/M2 | DIASTOLIC BLOOD PRESSURE: 76 MMHG | WEIGHT: 132.8 LBS

## 2023-04-12 DIAGNOSIS — Z34.03 ENCOUNTER FOR SUPERVISION OF NORMAL FIRST PREGNANCY IN THIRD TRIMESTER: Primary | ICD-10-CM

## 2023-04-12 LAB
BLOOD URINE, POC: NEGATIVE
EXPIRATION DATE: ABNORMAL
GLUCOSE URINE, POC: NEGATIVE
KETONES, POC: NEGATIVE
LEUKOCYTE EST, POC: ABNORMAL
Lab: ABNORMAL
NITRITE, POC: NEGATIVE
POCT MANUFACTURER: ABNORMAL
PROTEIN, POC: ABNORMAL

## 2023-04-12 PROCEDURE — 81002 URINALYSIS NONAUTO W/O SCOPE: CPT | Performed by: OBSTETRICS & GYNECOLOGY

## 2023-04-12 PROCEDURE — 0502F SUBSEQUENT PRENATAL CARE: CPT | Performed by: OBSTETRICS & GYNECOLOGY

## 2023-04-12 NOTE — PRENATAL NOTE
Patient seen today 15 weeks and 4 days she had a normal NIPT for baby girl we will check an AFP today she has a anatomy scan scheduled for 20 weeks today's ultrasound done just reassured us that the baby has good movement good heart rate confirmed female anatomy no abnormalities given the patient's age of 38 at the time of delivery we did discuss advanced maternal age and her underlying stillborn risk of 3 per 10,000 will be mildly increased to 5-6 per 10,000 for that should be getting ultrasounds at 28/32/30 6 weeks for size growth and fluid along with a nonstress test starting at 34 weeks she can consider once or twice a week patient is in agreement

## 2023-04-14 DIAGNOSIS — Z13.79 GENETIC SCREENING: Primary | ICD-10-CM

## 2023-04-20 LAB
AFP INTERP SERPL-IMP: NORMAL
AFP INTERP SERPL-IMP: NORMAL
AFP MOM SERPL: 0.84
AFP SERPL-MCNC: 29.8 NG/ML
AGE AT DELIVERY: 40.2 YR
GA METHOD: NORMAL
GA: 15.6 WEEKS
IDDM PATIENT QL: NO
LAB CORP COMMENT:: NORMAL
LAB CORP RESULTS: NORMAL
MULTIPLE PREGNANCY: NO
NEURAL TUBE DEFECT RISK FETUS: NORMAL %

## 2023-05-03 ENCOUNTER — ROUTINE PRENATAL (OUTPATIENT)
Dept: OBSTETRICS AND GYNECOLOGY | Facility: CLINIC | Age: 38
End: 2023-05-03
Payer: COMMERCIAL

## 2023-05-03 VITALS — WEIGHT: 135.8 LBS | DIASTOLIC BLOOD PRESSURE: 62 MMHG | SYSTOLIC BLOOD PRESSURE: 140 MMHG | BODY MASS INDEX: 21.27 KG/M2

## 2023-05-03 DIAGNOSIS — Z34.02 ENCOUNTER FOR SUPERVISION OF NORMAL FIRST PREGNANCY IN SECOND TRIMESTER: Primary | ICD-10-CM

## 2023-05-03 PROCEDURE — 0502F SUBSEQUENT PRENATAL CARE: CPT | Performed by: OBSTETRICS & GYNECOLOGY

## 2023-05-03 RX ORDER — ESCITALOPRAM OXALATE 5 MG/1
5 TABLET ORAL DAILY
COMMUNITY
Start: 2022-11-12 | End: 2023-09-21 | Stop reason: HOSPADM

## 2023-05-03 NOTE — PRENATAL NOTE
Patient here today concerns about decreased fetal movement she is only 18 weeks and 4/7 days ultrasound today confirms a viable fetus good fetal movement arms and legs good amniotic fluid volume healthy heart rate patient is reassured she had a normal alpha-fetoprotein blood sample her next up would be to get her anatomy scan

## 2023-05-15 ENCOUNTER — HOSPITAL ENCOUNTER (OUTPATIENT)
Dept: PERINATAL CARE | Facility: HOSPITAL | Age: 38
Discharge: HOME | End: 2023-05-15
Attending: OBSTETRICS & GYNECOLOGY
Payer: COMMERCIAL

## 2023-05-15 ENCOUNTER — ROUTINE PRENATAL (OUTPATIENT)
Dept: OBSTETRICS AND GYNECOLOGY | Facility: CLINIC | Age: 38
End: 2023-05-15
Payer: COMMERCIAL

## 2023-05-15 VITALS — SYSTOLIC BLOOD PRESSURE: 126 MMHG | WEIGHT: 139.2 LBS | DIASTOLIC BLOOD PRESSURE: 64 MMHG | BODY MASS INDEX: 21.8 KG/M2

## 2023-05-15 DIAGNOSIS — Z3A.20 20 WEEKS GESTATION OF PREGNANCY: ICD-10-CM

## 2023-05-15 DIAGNOSIS — O09.522 MULTIGRAVIDA OF ADVANCED MATERNAL AGE IN SECOND TRIMESTER: ICD-10-CM

## 2023-05-15 DIAGNOSIS — O35.9XX0 SUSPECTED FETAL ABNORMALITY AFFECTING MANAGEMENT OF MOTHER, ANTEPARTUM, SINGLE OR UNSPECIFIED FETUS: Primary | ICD-10-CM

## 2023-05-15 DIAGNOSIS — Z34.82 PRENATAL CARE, SUBSEQUENT PREGNANCY, SECOND TRIMESTER: Primary | ICD-10-CM

## 2023-05-15 DIAGNOSIS — Z36.3 ANTENATAL SCREENING FOR MALFORMATION USING ULTRASONICS: ICD-10-CM

## 2023-05-15 DIAGNOSIS — O09.522 SUPERVISION OF ELDERLY MULTIGRAVIDA IN SECOND TRIMESTER: ICD-10-CM

## 2023-05-15 LAB
BLOOD URINE, POC: NEGATIVE
EXPIRATION DATE: ABNORMAL
GLUCOSE URINE, POC: NEGATIVE
KETONES, POC: NEGATIVE
LEUKOCYTE EST, POC: ABNORMAL
Lab: ABNORMAL
NITRITE, POC: NEGATIVE
POCT MANUFACTURER: ABNORMAL
PROTEIN, POC: NEGATIVE

## 2023-05-15 PROCEDURE — 76811 OB US DETAILED SNGL FETUS: CPT

## 2023-05-15 PROCEDURE — 0502F SUBSEQUENT PRENATAL CARE: CPT | Performed by: OBSTETRICS & GYNECOLOGY

## 2023-05-15 PROCEDURE — 81002 URINALYSIS NONAUTO W/O SCOPE: CPT | Performed by: OBSTETRICS & GYNECOLOGY

## 2023-06-16 ENCOUNTER — ROUTINE PRENATAL (OUTPATIENT)
Dept: OBSTETRICS AND GYNECOLOGY | Facility: CLINIC | Age: 38
End: 2023-06-16
Payer: COMMERCIAL

## 2023-06-16 VITALS — DIASTOLIC BLOOD PRESSURE: 66 MMHG | BODY MASS INDEX: 22.58 KG/M2 | SYSTOLIC BLOOD PRESSURE: 126 MMHG | WEIGHT: 144.2 LBS

## 2023-06-16 DIAGNOSIS — Z3A.24 24 WEEKS GESTATION OF PREGNANCY: ICD-10-CM

## 2023-06-16 DIAGNOSIS — Z13.1 SCREENING FOR DIABETES MELLITUS: ICD-10-CM

## 2023-06-16 DIAGNOSIS — Z34.82 PRENATAL CARE, SUBSEQUENT PREGNANCY, SECOND TRIMESTER: Primary | ICD-10-CM

## 2023-06-16 DIAGNOSIS — Z13.0 SCREENING FOR DEFICIENCY ANEMIA: ICD-10-CM

## 2023-06-16 LAB
BLOOD URINE, POC: NEGATIVE
EXPIRATION DATE: NORMAL
GLUCOSE URINE, POC: NEGATIVE
KETONES, POC: NEGATIVE
LEUKOCYTE EST, POC: NEGATIVE
Lab: NORMAL
NITRITE, POC: NEGATIVE
POCT MANUFACTURER: NORMAL
PROTEIN, POC: NEGATIVE

## 2023-06-16 PROCEDURE — 81002 URINALYSIS NONAUTO W/O SCOPE: CPT | Performed by: OBSTETRICS & GYNECOLOGY

## 2023-06-16 PROCEDURE — 0502F SUBSEQUENT PRENATAL CARE: CPT | Performed by: OBSTETRICS & GYNECOLOGY

## 2023-07-06 ENCOUNTER — TELEPHONE (OUTPATIENT)
Dept: OBSTETRICS AND GYNECOLOGY | Facility: CLINIC | Age: 38
End: 2023-07-06

## 2023-07-06 ENCOUNTER — TELEPHONE (OUTPATIENT)
Dept: OBSTETRICS AND GYNECOLOGY | Facility: CLINIC | Age: 38
End: 2023-07-06
Payer: COMMERCIAL

## 2023-07-06 LAB
ERYTHROCYTE [DISTWIDTH] IN BLOOD BY AUTOMATED COUNT: 12.7 % (ref 11.7–15.4)
GLUCOSE 1H P 50 G GLC PO SERPL-MCNC: 94 MG/DL (ref 70–139)
HCT VFR BLD AUTO: 30.2 % (ref 34–46.6)
HGB BLD-MCNC: 9.8 G/DL (ref 11.1–15.9)
MCH RBC QN AUTO: 29.7 PG (ref 26.6–33)
MCHC RBC AUTO-ENTMCNC: 32.5 G/DL (ref 31.5–35.7)
MCV RBC AUTO: 92 FL (ref 79–97)
PLATELET # BLD AUTO: 197 X10E3/UL (ref 150–450)
RBC # BLD AUTO: 3.3 X10E6/UL (ref 3.77–5.28)
WBC # BLD AUTO: 6 X10E3/UL (ref 3.4–10.8)

## 2023-07-06 NOTE — TELEPHONE ENCOUNTER
Patient called about lab results. Hgb is low. Advised patient to begin taking Slow FE daily in addition to PNV. She states she has not been taking PNV as it makes her nauseous. Suggested she try to take it at night prior to going to sleep, so she can sleep through the side effects.

## 2023-07-11 ENCOUNTER — ROUTINE PRENATAL (OUTPATIENT)
Dept: OBSTETRICS AND GYNECOLOGY | Facility: CLINIC | Age: 38
End: 2023-07-11
Payer: COMMERCIAL

## 2023-07-11 VITALS — SYSTOLIC BLOOD PRESSURE: 118 MMHG | WEIGHT: 145.4 LBS | BODY MASS INDEX: 22.77 KG/M2 | DIASTOLIC BLOOD PRESSURE: 62 MMHG

## 2023-07-11 DIAGNOSIS — Z34.01 ENCOUNTER FOR SUPERVISION OF NORMAL FIRST PREGNANCY IN FIRST TRIMESTER: Primary | ICD-10-CM

## 2023-07-11 PROCEDURE — 0502F SUBSEQUENT PRENATAL CARE: CPT | Performed by: OBSTETRICS & GYNECOLOGY

## 2023-07-11 PROCEDURE — 81002 URINALYSIS NONAUTO W/O SCOPE: CPT | Performed by: OBSTETRICS & GYNECOLOGY

## 2023-07-11 RX ORDER — PANTOPRAZOLE SODIUM 40 MG/1
40 TABLET, DELAYED RELEASE ORAL DAILY
Qty: 90 TABLET | Refills: 1 | Status: ON HOLD | OUTPATIENT
Start: 2023-07-11 | End: 2023-09-25

## 2023-07-11 RX ORDER — CLOBETASOL PROPIONATE 0.5 MG/G
OINTMENT TOPICAL
COMMUNITY
Start: 2023-06-13 | End: 2023-09-21 | Stop reason: HOSPADM

## 2023-07-11 NOTE — PRENATAL NOTE
Patient seen today 28 weeks 3 days she has a couple of concerns    1.  Has an upper respiratory tract infection I prescribed over-the-counter medication she can consider Tylenol Sinus Tylenol Cold/Kane-Synephrine/Afrin/Mucinex/phenylephrine    2.  Patient reports some shortness of breath increasing heart rate I have explained it is at the terminal adaptation to pregnancy she is presently not short of breath heart rate is normal she will monitor    3.  Patient has reflux prescribed Protonix and Gaviscon    4.  She has anemia presently taking medication    Patient will get her Tdap next visit pediatrics is in [place] she will begin an ultrasound at 32 and 36 weeks given age along with a nonstress test starting at 34 weeks patient is aware

## 2023-08-03 LAB
BASOPHILS # BLD AUTO: 0 X10E3/UL (ref 0–0.2)
BASOPHILS NFR BLD AUTO: 1 %
EOSINOPHIL # BLD AUTO: 0.1 X10E3/UL (ref 0–0.4)
EOSINOPHIL NFR BLD AUTO: 1 %
ERYTHROCYTE [DISTWIDTH] IN BLOOD BY AUTOMATED COUNT: 14.6 % (ref 11.7–15.4)
HCT VFR BLD AUTO: 32.4 % (ref 34–46.6)
HGB BLD-MCNC: 10.5 G/DL (ref 11.1–15.9)
IMM GRANULOCYTES # BLD AUTO: 0 X10E3/UL (ref 0–0.1)
IMM GRANULOCYTES NFR BLD AUTO: 0 %
LYMPHOCYTES # BLD AUTO: 1.3 X10E3/UL (ref 0.7–3.1)
LYMPHOCYTES NFR BLD AUTO: 20 %
MCH RBC QN AUTO: 30.1 PG (ref 26.6–33)
MCHC RBC AUTO-ENTMCNC: 32.4 G/DL (ref 31.5–35.7)
MCV RBC AUTO: 93 FL (ref 79–97)
MONOCYTES # BLD AUTO: 0.6 X10E3/UL (ref 0.1–0.9)
MONOCYTES NFR BLD AUTO: 9 %
NEUTROPHILS # BLD AUTO: 4.3 X10E3/UL (ref 1.4–7)
NEUTROPHILS NFR BLD AUTO: 69 %
PLATELET # BLD AUTO: 169 X10E3/UL (ref 150–450)
RBC # BLD AUTO: 3.49 X10E6/UL (ref 3.77–5.28)
WBC # BLD AUTO: 6.2 X10E3/UL (ref 3.4–10.8)

## 2023-08-07 ENCOUNTER — HOSPITAL ENCOUNTER (OUTPATIENT)
Dept: PERINATAL CARE | Facility: HOSPITAL | Age: 38
Discharge: HOME | End: 2023-08-07
Attending: OBSTETRICS & GYNECOLOGY
Payer: COMMERCIAL

## 2023-08-07 DIAGNOSIS — Z3A.32 32 WEEKS GESTATION OF PREGNANCY: ICD-10-CM

## 2023-08-07 DIAGNOSIS — O09.523 SUPERVISION OF ELDERLY MULTIGRAVIDA IN THIRD TRIMESTER: Primary | ICD-10-CM

## 2023-08-07 PROCEDURE — 76816 OB US FOLLOW-UP PER FETUS: CPT

## 2023-08-08 ENCOUNTER — ROUTINE PRENATAL (OUTPATIENT)
Dept: OBSTETRICS AND GYNECOLOGY | Facility: CLINIC | Age: 38
End: 2023-08-08
Payer: COMMERCIAL

## 2023-08-08 VITALS — WEIGHT: 149 LBS | DIASTOLIC BLOOD PRESSURE: 78 MMHG | BODY MASS INDEX: 23.34 KG/M2 | SYSTOLIC BLOOD PRESSURE: 104 MMHG

## 2023-08-08 DIAGNOSIS — Z34.02 ENCOUNTER FOR SUPERVISION OF NORMAL FIRST PREGNANCY IN SECOND TRIMESTER: Primary | ICD-10-CM

## 2023-08-08 PROCEDURE — 81002 URINALYSIS NONAUTO W/O SCOPE: CPT | Performed by: OBSTETRICS & GYNECOLOGY

## 2023-08-08 PROCEDURE — 0502F SUBSEQUENT PRENATAL CARE: CPT | Performed by: OBSTETRICS & GYNECOLOGY

## 2023-08-08 PROCEDURE — 90715 TDAP VACCINE 7 YRS/> IM: CPT | Performed by: OBSTETRICS & GYNECOLOGY

## 2023-08-08 PROCEDURE — 90471 IMMUNIZATION ADMIN: CPT | Mod: XU | Performed by: OBSTETRICS & GYNECOLOGY

## 2023-08-08 RX ORDER — CYCLOBENZAPRINE HCL 10 MG
10 TABLET ORAL 3 TIMES DAILY PRN
Qty: 30 TABLET | Refills: 1 | Status: SHIPPED | OUTPATIENT
Start: 2023-08-08 | End: 2023-09-21 | Stop reason: HOSPADM

## 2023-08-08 NOTE — PRENATAL NOTE
Destiney is seen today 32 weeks 3/7 days she had a recent ultrasound baby weighed 4 pounds 2 ounces 42nd percentile DARREL of 11.89 she get a Tdap today she will start her nonstress testing at 34 weeks due to advanced maternal age along with that 7 advised that she had a follow-up scan at 36 weeks hemoglobin is now up to 10.5 on Slow Fe    She has some sciatica type pain I advised a maternity binder/thermic care heat patches/Flexeril/Tylenol patient could consider physical therapy

## 2023-08-23 ENCOUNTER — HOSPITAL ENCOUNTER (OUTPATIENT)
Dept: PERINATAL CARE | Facility: HOSPITAL | Age: 38
Discharge: HOME | End: 2023-08-23
Attending: OBSTETRICS & GYNECOLOGY
Payer: COMMERCIAL

## 2023-08-23 VITALS — DIASTOLIC BLOOD PRESSURE: 55 MMHG | SYSTOLIC BLOOD PRESSURE: 106 MMHG

## 2023-08-23 DIAGNOSIS — Z3A.34 34 WEEKS GESTATION OF PREGNANCY: ICD-10-CM

## 2023-08-23 DIAGNOSIS — O09.523 AMA (ADVANCED MATERNAL AGE) MULTIGRAVIDA 35+, THIRD TRIMESTER: Primary | ICD-10-CM

## 2023-08-23 PROCEDURE — 76815 OB US LIMITED FETUS(S): CPT

## 2023-08-25 ENCOUNTER — ROUTINE PRENATAL (OUTPATIENT)
Dept: OBSTETRICS AND GYNECOLOGY | Facility: CLINIC | Age: 38
End: 2023-08-25
Payer: COMMERCIAL

## 2023-08-25 VITALS — SYSTOLIC BLOOD PRESSURE: 118 MMHG | BODY MASS INDEX: 23.59 KG/M2 | WEIGHT: 150.6 LBS | DIASTOLIC BLOOD PRESSURE: 70 MMHG

## 2023-08-25 DIAGNOSIS — Z34.83 PRENATAL CARE, SUBSEQUENT PREGNANCY, THIRD TRIMESTER: Primary | ICD-10-CM

## 2023-08-25 DIAGNOSIS — Z3A.35 35 WEEKS GESTATION OF PREGNANCY: ICD-10-CM

## 2023-08-25 PROCEDURE — 81002 URINALYSIS NONAUTO W/O SCOPE: CPT | Performed by: OBSTETRICS & GYNECOLOGY

## 2023-08-25 PROCEDURE — 0502F SUBSEQUENT PRENATAL CARE: CPT | Performed by: OBSTETRICS & GYNECOLOGY

## 2023-08-25 NOTE — PRENATAL NOTE
AMA- will start NSTs weekly and has f/u growth scan at 36 weeks    Wants eIOL at 39+ weeks- discussed will check closer to 38+ weeks

## 2023-08-30 ENCOUNTER — HOSPITAL ENCOUNTER (OUTPATIENT)
Dept: PERINATAL CARE | Facility: HOSPITAL | Age: 38
Discharge: HOME | End: 2023-08-30
Attending: OBSTETRICS & GYNECOLOGY
Payer: COMMERCIAL

## 2023-08-30 VITALS — SYSTOLIC BLOOD PRESSURE: 100 MMHG | DIASTOLIC BLOOD PRESSURE: 62 MMHG

## 2023-08-30 DIAGNOSIS — O09.523 AMA (ADVANCED MATERNAL AGE) MULTIGRAVIDA 35+, THIRD TRIMESTER: Primary | ICD-10-CM

## 2023-08-30 DIAGNOSIS — Z3A.35 35 WEEKS GESTATION OF PREGNANCY: ICD-10-CM

## 2023-08-30 PROCEDURE — 76815 OB US LIMITED FETUS(S): CPT

## 2023-09-01 ENCOUNTER — ROUTINE PRENATAL (OUTPATIENT)
Dept: OBSTETRICS AND GYNECOLOGY | Facility: CLINIC | Age: 38
End: 2023-09-01
Payer: COMMERCIAL

## 2023-09-01 VITALS
BODY MASS INDEX: 23.86 KG/M2 | HEIGHT: 67 IN | WEIGHT: 152 LBS | DIASTOLIC BLOOD PRESSURE: 60 MMHG | SYSTOLIC BLOOD PRESSURE: 96 MMHG

## 2023-09-01 DIAGNOSIS — Z3A.36 36 WEEKS GESTATION OF PREGNANCY: Primary | ICD-10-CM

## 2023-09-01 PROCEDURE — 0502F SUBSEQUENT PRENATAL CARE: CPT | Performed by: OBSTETRICS & GYNECOLOGY

## 2023-09-01 PROCEDURE — 81002 URINALYSIS NONAUTO W/O SCOPE: CPT | Performed by: OBSTETRICS & GYNECOLOGY

## 2023-09-01 NOTE — PRENATAL NOTE
Feeling well  Got covid booster and flu shot  GBS next visit   AMA - has growth US 9/5 - getting weekly NSTs. For 39 week IOL  prec reviewed.

## 2023-09-05 ENCOUNTER — HOSPITAL ENCOUNTER (OUTPATIENT)
Dept: PERINATAL CARE | Facility: HOSPITAL | Age: 38
Discharge: HOME | End: 2023-09-05
Attending: OBSTETRICS & GYNECOLOGY
Payer: COMMERCIAL

## 2023-09-05 ENCOUNTER — TRANSCRIBE ORDERS (OUTPATIENT)
Dept: OBSTETRICS AND GYNECOLOGY | Facility: CLINIC | Age: 38
End: 2023-09-05

## 2023-09-05 VITALS — DIASTOLIC BLOOD PRESSURE: 57 MMHG | SYSTOLIC BLOOD PRESSURE: 107 MMHG

## 2023-09-05 DIAGNOSIS — Z3A.36 36 WEEKS GESTATION OF PREGNANCY: ICD-10-CM

## 2023-09-05 DIAGNOSIS — O09.523 AMA (ADVANCED MATERNAL AGE) MULTIGRAVIDA 35+, THIRD TRIMESTER: Primary | ICD-10-CM

## 2023-09-05 DIAGNOSIS — O36.5930 MATERNAL CARE FOR OTHER KNOWN OR SUSPECTED POOR FETAL GROWTH, THIRD TRIMESTER, NOT APPLICABLE OR UNSPECIFIED: Primary | ICD-10-CM

## 2023-09-05 PROCEDURE — 59025 FETAL NON-STRESS TEST: CPT

## 2023-09-07 ENCOUNTER — ROUTINE PRENATAL (OUTPATIENT)
Dept: OBSTETRICS AND GYNECOLOGY | Facility: CLINIC | Age: 38
End: 2023-09-07
Payer: COMMERCIAL

## 2023-09-07 VITALS
SYSTOLIC BLOOD PRESSURE: 96 MMHG | HEIGHT: 67 IN | BODY MASS INDEX: 23.98 KG/M2 | WEIGHT: 152.8 LBS | DIASTOLIC BLOOD PRESSURE: 64 MMHG

## 2023-09-07 DIAGNOSIS — Z36.85 ANTENATAL SCREENING FOR STREPTOCOCCUS B: ICD-10-CM

## 2023-09-07 DIAGNOSIS — Z3A.36 36 WEEKS GESTATION OF PREGNANCY: ICD-10-CM

## 2023-09-07 DIAGNOSIS — Z34.83 PRENATAL CARE, SUBSEQUENT PREGNANCY, THIRD TRIMESTER: Primary | ICD-10-CM

## 2023-09-07 NOTE — PRENATAL NOTE
"{(Reminder  patient on COVID19 Immunization, document with the SmartPhrase -  \"OBCOVNOTE\"):69514}    GBS collected    AMA  PTC scan at 36 weeks- Estimated FW: 2664 gm. 5 lb 14 oz 32 %Tile (some measurements at 10-12%ile)-plan for repeat growth scan in 2 weeks (AC in 44th ile)  NSTs weekly  Plan for IOL at 39+ weeks       "

## 2023-09-09 ENCOUNTER — HOSPITAL ENCOUNTER (OUTPATIENT)
Facility: HOSPITAL | Age: 38
Discharge: HOME | End: 2023-09-09
Attending: OBSTETRICS & GYNECOLOGY | Admitting: OBSTETRICS & GYNECOLOGY
Payer: COMMERCIAL

## 2023-09-09 VITALS
TEMPERATURE: 98.5 F | BODY MASS INDEX: 24.04 KG/M2 | WEIGHT: 153.2 LBS | HEIGHT: 67 IN | RESPIRATION RATE: 18 BRPM | DIASTOLIC BLOOD PRESSURE: 62 MMHG | HEART RATE: 77 BPM | SYSTOLIC BLOOD PRESSURE: 117 MMHG

## 2023-09-09 PROBLEM — L29.9 PRURITUS OF PREGNANCY IN THIRD TRIMESTER: Status: ACTIVE | Noted: 2023-09-09

## 2023-09-09 PROBLEM — O99.713 PRURITUS OF PREGNANCY IN THIRD TRIMESTER: Status: ACTIVE | Noted: 2023-09-09

## 2023-09-09 LAB
ALBUMIN SERPL-MCNC: 3.6 G/DL (ref 3.5–5.7)
ALP SERPL-CCNC: 130 IU/L (ref 34–125)
ALT SERPL-CCNC: 7 IU/L (ref 7–52)
ANION GAP SERPL CALC-SCNC: 6 MEQ/L (ref 3–15)
AST SERPL-CCNC: 14 IU/L (ref 13–39)
BILIRUB SERPL-MCNC: 0.3 MG/DL (ref 0.3–1.2)
BUN SERPL-MCNC: 5 MG/DL (ref 7–25)
CALCIUM SERPL-MCNC: 8.5 MG/DL (ref 8.6–10.3)
CHLORIDE SERPL-SCNC: 105 MEQ/L (ref 98–107)
CO2 SERPL-SCNC: 25 MEQ/L (ref 21–31)
CREAT SERPL-MCNC: 0.5 MG/DL (ref 0.6–1.2)
GFR SERPL CREATININE-BSD FRML MDRD: >60 ML/MIN/1.73M*2
GLUCOSE SERPL-MCNC: 83 MG/DL (ref 70–99)
POTASSIUM SERPL-SCNC: 4.2 MEQ/L (ref 3.5–5.1)
PROT SERPL-MCNC: 6.2 G/DL (ref 6–8.2)
SODIUM SERPL-SCNC: 136 MEQ/L (ref 136–145)

## 2023-09-09 PROCEDURE — 200200 PR NO CHARGE: Performed by: OBSTETRICS & GYNECOLOGY

## 2023-09-09 PROCEDURE — 87150 DNA/RNA AMPLIFIED PROBE: CPT

## 2023-09-09 PROCEDURE — 36415 COLL VENOUS BLD VENIPUNCTURE: CPT

## 2023-09-09 PROCEDURE — 87081 CULTURE SCREEN ONLY: CPT

## 2023-09-09 PROCEDURE — 80053 COMPREHEN METABOLIC PANEL: CPT

## 2023-09-09 PROCEDURE — 82542 COL CHROMOTOGRAPHY QUAL/QUAN: CPT

## 2023-09-09 RX ORDER — URSODIOL 300 MG/1
300 CAPSULE ORAL 2 TIMES DAILY
Qty: 120 CAPSULE | Refills: 0 | Status: ON HOLD | OUTPATIENT
Start: 2023-09-09 | End: 2023-09-25

## 2023-09-09 NOTE — H&P
"  HPI     Celine Engle is a 38 y.o.  at 37w0d with an estimated due date of 2023, by Last Menstrual Period who presents to labor and delivery complaining of itchiness. She reports her generalized itching that began yesterday while she was at work.  She says the itching has been constant, and \"jumps around\" on her body from legs to scalp to wrists, etc.. The itching kept her awake for most of the night last night.  She took Benadryl, which provided no relief.  She reports a cool shower helped temporarily, but the itching resumed after she dried off.  She denies rash, redness, any changes in bath or laundry products.  She reports a few [place] overnight that she describes as a mild tightening. She denies vaginal bleeding, leakage of fluid. She reports good fetal movement.     Pregnancy complicated by:   1. Advanced maternal age: Most recent growth scan showed EFW  2664 g (5 lbs 14 oz) in the 32%ile.  2. Anemia: Taking PO Fe as intermittently as tolerated. Most recent hgb on 8/3/23 was 10.5.  3. Acid reflux: Taking gaviscon.  4. Elevated BP reading: Patient has been normotensive throughout pregnancy with one mild range outlier on 5/3/23 at 18w4d.  5. Subclinical hypothyroidism    OB History:   OB History    Para Term  AB Living   4 3 3 0 0 3   SAB IAB Ectopic Multiple Live Births   0 0 0 0 3      # Outcome Date GA Lbr Luis Alberto/2nd Weight Sex Delivery Anes PTL Lv   4 Current            3 Term            2 Term            1 Term                 GYN History: Denies history of fibroids, polyps, sexually transmitted infections and abnormal pap smears.    Medical History:   Past Medical History:   Diagnosis Date    Anxiety     H/O migraine     UTI (urinary tract infection)      Denies history of: asthma, kidney disease, liver disease, seizures, blood disorder, hypertension and diabetes.    Surgical History:   Past Surgical History:   Procedure Laterality Date    DILATION AND CURETTAGE, " DIAGNOSTIC / THERAPEUTIC      WISDOM TOOTH EXTRACTION         Allergies: Patient has no known allergies.    Home Medications:   Prior to Admission medications    Medication Sig Start Date End Date Taking? Authorizing Provider   ferrous sulfate ER (SLOW IRON) 140 mg (45 mg iron) tablet Take 1 tablet (140 mg total) by mouth 2 (two) times a day with meals. 7/6/23  Yes Pedro Olmstead MD   prenatal vit no.130-iron-folic 27 mg iron- 800 mcg tablet tablet Take 1 tablet by mouth daily.   Yes Provider, Lc Kaba MD   clobetasoL (TEMOVATE) 0.05 % ointment  6/13/23   Provider, Lc Kaba MD   cyclobenzaprine (FLEXERIL) 10 mg tablet Take 1 tablet (10 mg total) by mouth 3 (three) times a day as needed for muscle spasms for up to 14 days. 8/8/23 8/22/23  Jean Alvarenga MD   escitalopram (LEXAPRO) 5 mg tablet Take 5 mg by mouth daily. 11/12/22   Provider, Lc Kaba MD   pantoprazole (PROTONIX) 40 mg EC tablet Take 1 tablet (40 mg total) by mouth daily. 7/11/23 1/7/24  Jean Alvarenga MD         Objective     Vital Signs (last 24h):  Temp:  [36.9 °C (98.5 °F)] 36.9 °C (98.5 °F)  Heart Rate:  [77] 77  Resp:  [18] 18  BP: (117)/(62) 117/62      Electronic Fetal Monitoring:  FHR Baseline: 150  FHR Variability: moderate  FHR Accelerations: present  FHR Decelerations: isolated variable deceleration present    Tocometer:  Contraction Frequency: 1 contraction in 30 minutes    Exam:  General appearance: alert, no acute distress  Cardiac: normal heart sounds  Pulmonary: clear to auscultation bilaterally, no increased respiratory effort  Abdomen: gravid, nontender  Female genitalia: see cervical exam  Extremities: no lower extremity edema   Skin: no rash, erythema. Healing excoriations on legs patient reports were from previous bug bites and unrelated to current itching.    Bedside Ultrasounds:   Cephalic    Cervical exam:  1/0/-3    Labs:  · Blood type: O+   · RPR: negative   · Syphilis Antibody: negative    · HepBsAg: not done   · Hep C Ab: not done   · Rubella: immune   · Rubeola: not done   · Varicella: not done    · HIV: not done   · Glucose tolerance testing: normal   · Group B Strep: not done   · Gonorrhea: not done   · Chlamydia: not done     CMP Results       23     1243        K 4.2    Cl 105    CO2 25    Glucose 83    BUN 5    Creatinine 0.5    Calcium 8.5    Anion Gap 6    AST 14    ALT 7    Albumin 3.6    EGFR >60.0            Assessment/Plan     Celine Engle is a 38 y.o.  at 37w0d, presenting to L&D with generalized pruritis.    1. Pruritis: CMP and bile acids collected to evaluate for cholestasis of pregnancy. AST, ALT wnl which is reassuring. Bile acids pending. Patient is stable for discharge home with strict return precautions. Reviewed kick counts. Prescription for ursodiol 300mg BID sent to pharmacy. Recommend additional supportive measures including cool showers, hydration. Patient to follow up with her OB office for appointment on 23.   2. FHT: Reactive.  3. GBS: Collected today. Results pending.      Discussed with Dr. Henley.    Lorena Miller MD

## 2023-09-09 NOTE — NURSING NOTE
Patient discharged to home. RN reviewed discharge instructions with patient and patient was given kick count sheet with education. Patient signed discharge instructions and verbalized understanding.  Patient left walking.

## 2023-09-10 LAB
GP B STREP DNA SPEC QL NAA+PROBE: NEGATIVE
GP B STREP SPEC QL CULT: NORMAL

## 2023-09-12 ENCOUNTER — ROUTINE PRENATAL (OUTPATIENT)
Dept: OBSTETRICS AND GYNECOLOGY | Facility: CLINIC | Age: 38
End: 2023-09-12
Payer: COMMERCIAL

## 2023-09-12 VITALS
HEIGHT: 67 IN | BODY MASS INDEX: 23.86 KG/M2 | DIASTOLIC BLOOD PRESSURE: 70 MMHG | SYSTOLIC BLOOD PRESSURE: 100 MMHG | WEIGHT: 152 LBS

## 2023-09-12 DIAGNOSIS — L29.9 PRURITUS OF PREGNANCY IN THIRD TRIMESTER: ICD-10-CM

## 2023-09-12 DIAGNOSIS — O99.713 PRURITUS OF PREGNANCY IN THIRD TRIMESTER: ICD-10-CM

## 2023-09-12 DIAGNOSIS — Z3A.37 37 WEEKS GESTATION OF PREGNANCY: Primary | ICD-10-CM

## 2023-09-12 PROCEDURE — 81002 URINALYSIS NONAUTO W/O SCOPE: CPT | Performed by: OBSTETRICS & GYNECOLOGY

## 2023-09-12 PROCEDURE — 0502F SUBSEQUENT PRENATAL CARE: CPT | Performed by: OBSTETRICS & GYNECOLOGY

## 2023-09-13 ENCOUNTER — TRANSCRIBE ORDERS (OUTPATIENT)
Dept: OBSTETRICS AND GYNECOLOGY | Facility: HOSPITAL | Age: 38
End: 2023-09-13

## 2023-09-13 ENCOUNTER — HOSPITAL ENCOUNTER (OUTPATIENT)
Dept: PERINATAL CARE | Facility: HOSPITAL | Age: 38
Discharge: HOME | End: 2023-09-13
Attending: OBSTETRICS & GYNECOLOGY
Payer: COMMERCIAL

## 2023-09-13 VITALS — DIASTOLIC BLOOD PRESSURE: 61 MMHG | SYSTOLIC BLOOD PRESSURE: 106 MMHG

## 2023-09-13 DIAGNOSIS — Z3A.37 37 WEEKS GESTATION OF PREGNANCY: ICD-10-CM

## 2023-09-13 DIAGNOSIS — O09.523 AMA (ADVANCED MATERNAL AGE) MULTIGRAVIDA 35+, THIRD TRIMESTER: Primary | ICD-10-CM

## 2023-09-13 DIAGNOSIS — Z34.01 ENCOUNTER FOR SUPERVISION OF NORMAL FIRST PREGNANCY, FIRST TRIMESTER: Primary | ICD-10-CM

## 2023-09-13 PROCEDURE — 76815 OB US LIMITED FETUS(S): CPT

## 2023-09-14 LAB
BILE AC SERPL-SCNC: <1.5 UMOL/L
CDCAE SERPL-SCNC: <0.5 UMOL/L
CHOLATE SERPL-SCNC: <0.5 UMOL/L
DO-CHOLATE SERPL-SCNC: 0.6 UMOL/L

## 2023-09-16 ENCOUNTER — HOSPITAL ENCOUNTER (OUTPATIENT)
Facility: HOSPITAL | Age: 38
Discharge: HOME | End: 2023-09-16
Attending: OBSTETRICS & GYNECOLOGY | Admitting: OBSTETRICS & GYNECOLOGY
Payer: COMMERCIAL

## 2023-09-16 VITALS
WEIGHT: 152 LBS | BODY MASS INDEX: 23.86 KG/M2 | RESPIRATION RATE: 18 BRPM | HEIGHT: 67 IN | OXYGEN SATURATION: 98 % | TEMPERATURE: 98 F | HEART RATE: 82 BPM | DIASTOLIC BLOOD PRESSURE: 58 MMHG | SYSTOLIC BLOOD PRESSURE: 100 MMHG

## 2023-09-16 PROCEDURE — 59025 FETAL NON-STRESS TEST: CPT

## 2023-09-16 ASSESSMENT — COGNITIVE AND FUNCTIONAL STATUS - GENERAL: DO YOU HAVE SERIOUS DIFFICULTY WALKING OR CLIMBING STAIRS: NO

## 2023-09-16 NOTE — NURSING NOTE
Pt discharged per physician order. RN educated pt on discharge instruction, including abnormal vaginal bleeding, membrane rupture, labor contractions, and fetal kick counts. Pt verbalized understanding. Walked off unit.

## 2023-09-16 NOTE — H&P
"  HPI     Celine Engle is a 38 y.o.  at 38w0d with an estimated due date of 2023, by Last Menstrual Period consistent with 9 week ultrasound who presents with pelvic pressure. Patient states she was up all night because of how uncomfortable she is with pelvic pressure. She says it \"feels like the baby is coming out.\" Patient is not appreciating any contractions. She denies LOF, but does report a small amount of bloody mucus on the toilet paper with wiping overnight. No further vaginal bleeding. Patient endorses regular fetal movement. Her most recent CVE was in the office on 23 and she was 3 cm dilated at that time.     Patient has a history of 3 previous full-term SVDs in , , and . Her heaviest baby weighed 8 lbs 1 oz.     Pregnancy complicated by:  1. AMA: NIPT negative. Most recent PTC ultrasound on 23 showed EFW 2664g, 5 lbs 14 oz, 32%. She is being monitored with weekly NSTs.   2. Anemia: Patient was previously taking oral iron, but states she stopped taking it because of GI upset. Most recent Hgb was 10.5 on 8/3/23.   3. Isolated elevated blood pressure: Patient noted to have a mild range BP of 140/62 at 18w4d on 5/3/23. She has otherwise been normotensive throughout pregnancy and has not been diagnosed with a hypertensive disorder.   4. Subclinical hypothyroidism: TFTs on 3/7/23 showed TSH 0.146 (low) and FT4 1.32. Patient will be for repeat TFTs postpartum.   5. Anxiety: Patient's mood has been stable throughout pregnancy. She has taken Lexapro in the past, but is not taking any medication currently. Denies SI/HI.     OB History:   OB History    Para Term  AB Living   4 3 3 0 0 3   SAB IAB Ectopic Multiple Live Births   0 0 0 0 3      # Outcome Date GA Lbr Luis Alberto/2nd Weight Sex Delivery Anes PTL Lv   4 Current            3 Term            2 Term            1 Term                 GYN History: Denies history of fibroids, cysts, polyps, sexually transmitted " infections and abnormal pap smears.    Medical History:   Past Medical History:   Diagnosis Date    Anxiety     H/O migraine     UTI (urinary tract infection)      Denies history of: asthma, kidney disease, liver disease, seizures and diabetes.    Surgical History:   Past Surgical History:   Procedure Laterality Date    DILATION AND CURETTAGE, DIAGNOSTIC / THERAPEUTIC      WISDOM TOOTH EXTRACTION         Allergies: Patient has no known allergies.    Home Medications:   Prior to Admission medications    Medication Sig Start Date End Date Taking? Authorizing Provider   clobetasoL (TEMOVATE) 0.05 % ointment  6/13/23   Provider, Lc Kaba MD   cyclobenzaprine (FLEXERIL) 10 mg tablet Take 1 tablet (10 mg total) by mouth 3 (three) times a day as needed for muscle spasms for up to 14 days. 8/8/23 8/22/23  Jean Alvarenga MD   escitalopram (LEXAPRO) 5 mg tablet Take 5 mg by mouth daily. 11/12/22   ProviderLc MD   ferrous sulfate ER (SLOW IRON) 140 mg (45 mg iron) tablet Take 1 tablet (140 mg total) by mouth 2 (two) times a day with meals. 7/6/23   Pedro Olmstead MD   pantoprazole (PROTONIX) 40 mg EC tablet Take 1 tablet (40 mg total) by mouth daily. 7/11/23 1/7/24  Jean Alvarenga MD   prenatal vit no.130-iron-folic 27 mg iron- 800 mcg tablet tablet Take 1 tablet by mouth daily.    Provider, Lc Kaba MD   ursodioL (ActigalL) 300 mg capsule Take 1 capsule (300 mg total) by mouth 2 (two) times a day. 9/9/23 11/8/23  Lorena Miller MD         Objective     Vital Signs (last 24h):  Temp:  [36.7 °C (98 °F)] 36.7 °C (98 °F)  Heart Rate:  [82] 82  Resp:  [18] 18  BP: (100)/(58) 100/58    Cervical Exam:  Cervical Dilation (cm): 3  Cervical Effacement: 50  Fetal Station: -3  Method: sterile exam per physician Nataliia) (09/16/23 0524)    Electronic Fetal Monitoring:  FHR Baseline: 135  FHR Variability: moderate  FHR Accelerations: present  FHR Decelerations:  absent    Tocometer:  Contraction Frequency: isolated contraction on toco     Exam:  General appearance: alert, no acute distress  Cardiac: normal heart sounds  Pulmonary: clear to auscultation bilaterally, no increased respiratory effort  Abdomen: gravid, nontender  Female genitalia: see cervical exam  Extremities: no lower extremity edema     Bedside Ultrasounds:   cephalic    Labs:  · Blood type: O+   · RPR: unknown   · Syphilis Antibody: negative   · HepBsAg: unknown   · Hep C Ab: unknown   · Rubella: immune   · Rubeola: unknown   · Varicella: unknown    · HIV: unknown   · Glucose tolerance testing: normal   · Group B Strep: negative   · Gonorrhea: unknown   · Chlamydia: unknown         Assessment/Plan     Celine Engle is a 38 y.o.  at 38w0d, presenting with pelvic pressure for rule out labor.     1. Rule out labor: Patient presents with complaints of pelvic pressure overnight. She denies contractions or LOF. CVE 3/50/-2, which is unchanged from office exam on 23. Isolated contraction on toco. Patient is not in labor at this time and is stable for discharge. She is amenable to this plan. Counseled her to use a heating pad, take a warm bath, and take tylenol/benadryl as needed for discomfort. Reviewed callback/return precautions, including worsening contractions, leakage of fluid, vaginal bleeding (apart from mild spotting, which is to be expected from a cervical exam), and decreased fetal movement. She expressed understanding and is comfortable with discharge home.   2. FHT: Reactive.  3. GBS: Negative.      Discussed with Dr. Lim.    Mandy Crum MD

## 2023-09-18 ENCOUNTER — ROUTINE PRENATAL (OUTPATIENT)
Dept: OBSTETRICS AND GYNECOLOGY | Facility: CLINIC | Age: 38
End: 2023-09-18
Payer: COMMERCIAL

## 2023-09-18 VITALS
SYSTOLIC BLOOD PRESSURE: 104 MMHG | DIASTOLIC BLOOD PRESSURE: 70 MMHG | WEIGHT: 154 LBS | BODY MASS INDEX: 24.17 KG/M2 | HEIGHT: 67 IN

## 2023-09-18 DIAGNOSIS — Z3A.38 38 WEEKS GESTATION OF PREGNANCY: ICD-10-CM

## 2023-09-18 DIAGNOSIS — Z34.83 PRENATAL CARE, SUBSEQUENT PREGNANCY, THIRD TRIMESTER: Primary | ICD-10-CM

## 2023-09-18 PROCEDURE — 81002 URINALYSIS NONAUTO W/O SCOPE: CPT | Performed by: OBSTETRICS & GYNECOLOGY

## 2023-09-18 PROCEDURE — 0502F SUBSEQUENT PRENATAL CARE: CPT | Performed by: OBSTETRICS & GYNECOLOGY

## 2023-09-19 ENCOUNTER — HOSPITAL ENCOUNTER (OUTPATIENT)
Dept: PERINATAL CARE | Facility: HOSPITAL | Age: 38
Discharge: HOME | End: 2023-09-19
Attending: OBSTETRICS & GYNECOLOGY
Payer: COMMERCIAL

## 2023-09-19 DIAGNOSIS — Z3A.38 38 WEEKS GESTATION OF PREGNANCY: ICD-10-CM

## 2023-09-19 DIAGNOSIS — Z03.74 SUSPECTED PROBLEM WITH FETAL GROWTH NOT FOUND: ICD-10-CM

## 2023-09-19 DIAGNOSIS — O09.523 AMA (ADVANCED MATERNAL AGE) MULTIGRAVIDA 35+, THIRD TRIMESTER: Primary | ICD-10-CM

## 2023-09-19 PROCEDURE — 76816 OB US FOLLOW-UP PER FETUS: CPT

## 2023-09-20 ENCOUNTER — TELEPHONE (OUTPATIENT)
Dept: OBSTETRICS AND GYNECOLOGY | Facility: CLINIC | Age: 38
End: 2023-09-20
Payer: COMMERCIAL

## 2023-09-20 ENCOUNTER — HOSPITAL ENCOUNTER (OUTPATIENT)
Dept: PERINATAL CARE | Facility: HOSPITAL | Age: 38
Discharge: HOME | End: 2023-09-20
Attending: OBSTETRICS & GYNECOLOGY
Payer: COMMERCIAL

## 2023-09-20 VITALS — SYSTOLIC BLOOD PRESSURE: 95 MMHG | DIASTOLIC BLOOD PRESSURE: 59 MMHG

## 2023-09-20 DIAGNOSIS — O09.523 AMA (ADVANCED MATERNAL AGE) MULTIGRAVIDA 35+, THIRD TRIMESTER: Primary | ICD-10-CM

## 2023-09-20 DIAGNOSIS — Z3A.38 38 WEEKS GESTATION OF PREGNANCY: ICD-10-CM

## 2023-09-20 PROCEDURE — 59025 FETAL NON-STRESS TEST: CPT

## 2023-09-21 ENCOUNTER — HOSPITAL ENCOUNTER (OUTPATIENT)
Facility: HOSPITAL | Age: 38
Discharge: HOME | End: 2023-09-21
Attending: OBSTETRICS & GYNECOLOGY | Admitting: OBSTETRICS & GYNECOLOGY
Payer: COMMERCIAL

## 2023-09-21 VITALS — HEART RATE: 82 BPM | DIASTOLIC BLOOD PRESSURE: 53 MMHG | TEMPERATURE: 98.2 F | SYSTOLIC BLOOD PRESSURE: 94 MMHG

## 2023-09-21 PROBLEM — Z34.90 TERM PREGNANCY: Status: ACTIVE | Noted: 2023-09-21

## 2023-09-21 PROBLEM — F41.9 ANXIETY: Status: ACTIVE | Noted: 2023-06-28

## 2023-09-21 PROBLEM — B02.9 HERPES ZOSTER WITHOUT COMPLICATION: Status: RESOLVED | Noted: 2022-09-25 | Resolved: 2023-09-21

## 2023-09-21 PROBLEM — G93.5 CHIARI MALFORMATION TYPE I (CMS/HCC): Status: ACTIVE | Noted: 2019-04-03

## 2023-09-21 PROBLEM — B02.9 HERPES ZOSTER WITHOUT COMPLICATION: Status: ACTIVE | Noted: 2022-09-25

## 2023-09-21 PROBLEM — O99.713 PRURITUS OF PREGNANCY IN THIRD TRIMESTER: Status: RESOLVED | Noted: 2023-09-09 | Resolved: 2023-09-21

## 2023-09-21 PROBLEM — R79.89 ABNORMAL SERUM THYROID STIMULATING HORMONE (TSH) LEVEL: Status: ACTIVE | Noted: 2023-06-28

## 2023-09-21 PROBLEM — L29.9 PRURITUS OF PREGNANCY IN THIRD TRIMESTER: Status: RESOLVED | Noted: 2023-09-09 | Resolved: 2023-09-21

## 2023-09-21 NOTE — TELEPHONE ENCOUNTER
Pt called on call pager with episode of vaginal bleeding. Has been passing mucus and some brown spotting all day. Tonight after voiding bladder she had some bright red blood mixed with mucus on toilet paper. The episode just happened so she is unsure if the bleeding is continuing. No ctx or pain. No LOF. Baby is moving well.     Recommend she put on a fresh pad and monitor for 30-60 minutes. If bleeding stops she can stay home. She will need to come in for persistent vaginal bleeding. She will call back if needed

## 2023-09-21 NOTE — H&P
HPI     Celine Engle is a 38 y.o. female  at 38w5d with an estimated due date of 2023, by Last Menstrual Period c/w 9 week US who presents with pelvic pressure and tightening that lasted 40 minutes earlier today.  She also reports she has been seeing intermittent brown spotting and mucous since her CVE in the office ; her CVE was 4/0/-2.  She reports seeing some bright red spotting last evening, only 1x.     Celine denies VB/LOF, reports irregular contractions, endorses +FM.     This pregnancy complicated by:  1. AMA: NIPT negative. Most recent PTC ultrasound on 23 showed EFW 2959g, 6 lbs 8 oz, 27%. She is being monitored with weekly NSTs.   2. Anemia: Patient was previously taking oral iron, but states she stopped taking it because of GI upset. Most recent Hgb was 10.5 on 8/3/23.   3. Isolated elevated blood pressure: Patient noted to have a mild range BP of 140/62 at 18w4d on 5/3/23. She has otherwise been normotensive throughout pregnancy and has not been diagnosed with a hypertensive disorder.   4. Subclinical hypothyroidism: TFTs on 3/7/23 showed TSH 0.146 (low) and FT4 1.32. Patient will be for repeat TFTs postpartum.   5. Anxiety: Patient's mood has been stable throughout pregnancy. She has taken Lexapro in the past, but is not taking any medication currently. Denies SI/HI.     OB History:   OB History    Para Term  AB Living   8 3 3 0 4 3   SAB IAB Ectopic Multiple Live Births   2 1 1 0 3      # Outcome Date GA Lbr Luis Alberto/2nd Weight Sex Delivery Anes PTL Lv   8 Current            7 Term 2017   3.714 kg (8 lb 3 oz) M   N    6 Term    3.912 kg (8 lb 10 oz) M   N    5 Term    3.374 kg (7 lb 7 oz) M   N    4 Ectopic 2011           3 SAB            2 SAB      D&E      1 IAB      D&E          Medical History:   Past Medical History:   Diagnosis Date    Anxiety     Chiari malformation type I (CMS/HCC)     follows w/neuro, stable since     H/O migraine      Herpes     HSV1/Oral    Ovarian cyst 2012    UTI (urinary tract infection)        Surgical History:   Past Surgical History:   Procedure Laterality Date    DILATION AND CURETTAGE, DIAGNOSTIC / THERAPEUTIC      WISDOM TOOTH EXTRACTION         Social History:   Social History     Socioeconomic History    Marital status:      Spouse name: None    Number of children: None    Years of education: None    Highest education level: None   Tobacco Use    Smoking status: Never    Smokeless tobacco: Never   Vaping Use    Vaping Use: Never used   Substance and Sexual Activity    Alcohol use: Not Currently    Drug use: Never    Sexual activity: Yes     Social Determinants of Health     Food Insecurity: No Food Insecurity (9/16/2023)    Hunger Vital Sign     Worried About Running Out of Food in the Last Year: Never true     Ran Out of Food in the Last Year: Never true        Family History: History reviewed. No pertinent family history.    Allergies: Patient has no known allergies.    Prior to Admission medications    Medication Sig Start Date End Date Taking? Authorizing Provider   clobetasoL (TEMOVATE) 0.05 % ointment  6/13/23   Provider, Lc Kaba MD   cyclobenzaprine (FLEXERIL) 10 mg tablet Take 1 tablet (10 mg total) by mouth 3 (three) times a day as needed for muscle spasms for up to 14 days. 8/8/23 8/22/23  Jean Alvarenga MD   escitalopram (LEXAPRO) 5 mg tablet Take 5 mg by mouth daily. 11/12/22   Provider, Lc Kaba MD   ferrous sulfate ER (SLOW IRON) 140 mg (45 mg iron) tablet Take 1 tablet (140 mg total) by mouth 2 (two) times a day with meals. 7/6/23   Pedro Olmstead MD   pantoprazole (PROTONIX) 40 mg EC tablet Take 1 tablet (40 mg total) by mouth daily. 7/11/23 1/7/24  Jean Alvarenga MD   prenatal vit no.130-iron-folic 27 mg iron- 800 mcg tablet tablet Take 1 tablet by mouth daily.    Provider, Lc Kaba MD   ursodioL (ActigalL) 300 mg capsule Take 1 capsule (300 mg  total) by mouth 2 (two) times a day. 23  Lorena Miller MD       Review of Systems  Pertinent items are noted in HPI.    Objective     Vital Signs for the last 24 hours:  Temp:  [36.8 °C (98.2 °F)] 36.8 °C (98.2 °F)    Latest cervical exam:  Cervical Dilation (cm): 4  Cervical Effacement: 0  Fetal Station: -2  Method: sterile exam per RN (Lorena NAVARRETE) ([id number])    Fetal Monitoring:  FHR Baseline: 140  FHR Variability: moderate  FHR Accelerations: present  FHR Decelerations: absent    Contraction Frequency: none on TOCO    Exam:  General Appearance: Alert, cooperative, no acute distress  Lungs: Clear to auscultation bilaterally, respirations unlabored  Heart: Regular rate and rhythm  Abdomen: gravid, nontender  Genitalia: See vaginal exam  Extremities: no edema or calf tenderness  Neurologic: grossly intact without focal deficits    Ultrasounds:   I have reviewed the applicable Ultrasounds.  PTC 2023 EFW 2959g (6#8, 27%ile), Posterior placenta    Bedside Ultrasounds:   cephalic    Labs:  Prenatal Labs:   · Blood type: O+   · RPR: negative*   · Syphilis Antibody: negative   · HepBsAg: negative*   · HepCAb: negative*  · Rubella: immune   · Rubeola: not done   · Varicella: immune*    · HIV: negative*  · Glucose tolerance testing: normal  · Group B Strep: negative  · Gonorrhea: negative*  · Chlamydia: negative*    *Results found on Media Tab under Chart Review    Assessment/Plan     Celine Engle is a 38 y.o. female  at 38w5d admitted for c/o pelvic pressure / spotting    - FHR: Reactive  - GBS: negative   - Pelvic Pressure/Spotting: CVE 4/0/-2 unchanged from last exam, no bleeding noted on exam, no contractions on TOCO.  Patient is ruled out for labor.  Pt stable for discharge home. Handouts given and reviewed about labor and fetal kick counts. Pt verbalizes understanding of discharge instructions and handouts.     Discussed with Dr. Ishan Saez,  CRNP

## 2023-09-22 NOTE — PRENATAL NOTE
feeling well  Had some itching full body, not palms/soles - seen on ld this weekend with normal lfts/BA are pending  AMA - NSts scheduled weekly. Good FM noted.   Cervix 3/50  gbs neg   Labor prec reviewed.

## 2023-09-25 ENCOUNTER — HOSPITAL ENCOUNTER (INPATIENT)
Facility: HOSPITAL | Age: 38
LOS: 2 days | Discharge: HOME | End: 2023-09-27
Attending: OBSTETRICS & GYNECOLOGY | Admitting: OBSTETRICS & GYNECOLOGY
Payer: COMMERCIAL

## 2023-09-25 ENCOUNTER — ANESTHESIA EVENT (INPATIENT)
Dept: OBSTETRICS AND GYNECOLOGY | Facility: HOSPITAL | Age: 38
End: 2023-09-25
Payer: COMMERCIAL

## 2023-09-25 ENCOUNTER — ANESTHESIA (INPATIENT)
Dept: OBSTETRICS AND GYNECOLOGY | Facility: HOSPITAL | Age: 38
End: 2023-09-25
Payer: COMMERCIAL

## 2023-09-25 PROBLEM — Z34.90 NORMAL PREGNANCY, UNSPECIFIED TRIMESTER: Status: ACTIVE | Noted: 2023-09-25

## 2023-09-25 LAB
ABO + RH BLD: NORMAL
BLD GP AB SCN SERPL QL: NEGATIVE
D AG BLD QL: POSITIVE
ERYTHROCYTE [DISTWIDTH] IN BLOOD BY AUTOMATED COUNT: 14.2 % (ref 11.7–14.4)
HBV SURFACE AG SER QL: NONREACTIVE
HCT VFR BLDCO AUTO: 34.8 % (ref 35–45)
HGB BLD-MCNC: 11 G/DL (ref 11.8–15.7)
LABORATORY COMMENT REPORT: NORMAL
MCH RBC QN AUTO: 28.9 PG (ref 28–33.2)
MCHC RBC AUTO-ENTMCNC: 31.6 G/DL (ref 32.2–35.5)
MCV RBC AUTO: 91.3 FL (ref 83–98)
PDW BLD AUTO: 10.4 FL (ref 9.4–12.3)
PLATELET # BLD AUTO: 192 K/UL (ref 150–369)
RBC # BLD AUTO: 3.81 M/UL (ref 3.93–5.22)
SPECIMEN EXP DATE BLD: NORMAL
T PALLIDUM AB SER QL IF: NONREACTIVE
WBC # BLD AUTO: 5.91 K/UL (ref 3.8–10.5)

## 2023-09-25 PROCEDURE — 25800000 HC PHARMACY IV SOLUTIONS

## 2023-09-25 PROCEDURE — 25000000 HC PHARMACY GENERAL

## 2023-09-25 PROCEDURE — 27200130 HC EPIDURAL ANES TRAY

## 2023-09-25 PROCEDURE — 72000012 HC VAGINAL DELIVERY LEVEL 2

## 2023-09-25 PROCEDURE — 10907ZC DRAINAGE OF AMNIOTIC FLUID, THERAPEUTIC FROM PRODUCTS OF CONCEPTION, VIA NATURAL OR ARTIFICIAL OPENING: ICD-10-PCS | Performed by: OBSTETRICS & GYNECOLOGY

## 2023-09-25 PROCEDURE — 63700000 HC SELF-ADMINISTRABLE DRUG: Performed by: NURSE PRACTITIONER

## 2023-09-25 PROCEDURE — 86900 BLOOD TYPING SEROLOGIC ABO: CPT

## 2023-09-25 PROCEDURE — 86780 TREPONEMA PALLIDUM: CPT

## 2023-09-25 PROCEDURE — 36415 COLL VENOUS BLD VENIPUNCTURE: CPT

## 2023-09-25 PROCEDURE — 63600000 HC DRUGS/DETAIL CODE

## 2023-09-25 PROCEDURE — 12000000 HC ROOM AND CARE MED/SURG

## 2023-09-25 PROCEDURE — 85027 COMPLETE CBC AUTOMATED: CPT

## 2023-09-25 PROCEDURE — 37000005 HC ANESTHESIA EPIDURAL/SPINAL: Performed by: ANESTHESIOLOGY

## 2023-09-25 PROCEDURE — 3E033VJ INTRODUCTION OF OTHER HORMONE INTO PERIPHERAL VEIN, PERCUTANEOUS APPROACH: ICD-10-PCS | Performed by: OBSTETRICS & GYNECOLOGY

## 2023-09-25 PROCEDURE — 87340 HEPATITIS B SURFACE AG IA: CPT

## 2023-09-25 PROCEDURE — 25000000 HC PHARMACY GENERAL: Performed by: ANESTHESIOLOGY

## 2023-09-25 PROCEDURE — 59400 OBSTETRICAL CARE: CPT | Performed by: OBSTETRICS & GYNECOLOGY

## 2023-09-25 PROCEDURE — 63600000 HC DRUGS/DETAIL CODE: Mod: JZ

## 2023-09-25 RX ORDER — MISOPROSTOL 100 UG/1
1000 TABLET ORAL ONCE AS NEEDED
Status: DISCONTINUED | OUTPATIENT
Start: 2023-09-25 | End: 2023-09-27 | Stop reason: HOSPADM

## 2023-09-25 RX ORDER — ACETAMINOPHEN 325 MG/1
650 TABLET ORAL EVERY 4 HOURS PRN
Status: DISCONTINUED | OUTPATIENT
Start: 2023-09-25 | End: 2023-09-27 | Stop reason: HOSPADM

## 2023-09-25 RX ORDER — SODIUM CHLORIDE, SODIUM LACTATE, POTASSIUM CHLORIDE, CALCIUM CHLORIDE 600; 310; 30; 20 MG/100ML; MG/100ML; MG/100ML; MG/100ML
125 INJECTION, SOLUTION INTRAVENOUS CONTINUOUS
Status: DISCONTINUED | OUTPATIENT
Start: 2023-09-25 | End: 2023-09-25

## 2023-09-25 RX ORDER — LIDOCAINE HYDROCHLORIDE AND EPINEPHRINE 15; 5 MG/ML; UG/ML
INJECTION, SOLUTION EPIDURAL
Status: COMPLETED | OUTPATIENT
Start: 2023-09-25 | End: 2023-09-25

## 2023-09-25 RX ORDER — CARBOPROST TROMETHAMINE 250 UG/ML
250 INJECTION, SOLUTION INTRAMUSCULAR ONCE AS NEEDED
Status: DISCONTINUED | OUTPATIENT
Start: 2023-09-25 | End: 2023-09-27 | Stop reason: HOSPADM

## 2023-09-25 RX ORDER — DIPHENHYDRAMINE HCL 25 MG
25 CAPSULE ORAL EVERY 6 HOURS PRN
Status: DISCONTINUED | OUTPATIENT
Start: 2023-09-25 | End: 2023-09-27 | Stop reason: HOSPADM

## 2023-09-25 RX ORDER — OXYTOCIN/0.9 % SODIUM CHLORIDE 30/500 ML
0-20 PLASTIC BAG, INJECTION (ML) INTRAVENOUS CONTINUOUS
Status: DISCONTINUED | OUTPATIENT
Start: 2023-09-25 | End: 2023-09-27 | Stop reason: HOSPADM

## 2023-09-25 RX ORDER — TRANEXAMIC ACID 10 MG/ML
1000 INJECTION, SOLUTION INTRAVENOUS ONCE AS NEEDED
Status: DISCONTINUED | OUTPATIENT
Start: 2023-09-25 | End: 2023-09-27 | Stop reason: HOSPADM

## 2023-09-25 RX ORDER — ONDANSETRON HYDROCHLORIDE 2 MG/ML
4 INJECTION, SOLUTION INTRAVENOUS EVERY 8 HOURS PRN
Status: DISCONTINUED | OUTPATIENT
Start: 2023-09-25 | End: 2023-09-27 | Stop reason: HOSPADM

## 2023-09-25 RX ORDER — ACETAMINOPHEN 325 MG/1
975 TABLET ORAL ONCE
Status: COMPLETED | OUTPATIENT
Start: 2023-09-25 | End: 2023-09-25

## 2023-09-25 RX ORDER — ALUMINUM HYDROXIDE, MAGNESIUM HYDROXIDE, AND SIMETHICONE 1200; 120; 1200 MG/30ML; MG/30ML; MG/30ML
30 SUSPENSION ORAL EVERY 4 HOURS PRN
Status: DISCONTINUED | OUTPATIENT
Start: 2023-09-25 | End: 2023-09-27 | Stop reason: HOSPADM

## 2023-09-25 RX ORDER — METHYLERGONOVINE MALEATE 0.2 MG/ML
200 INJECTION INTRAVENOUS ONCE AS NEEDED
Status: DISCONTINUED | OUTPATIENT
Start: 2023-09-25 | End: 2023-09-27 | Stop reason: HOSPADM

## 2023-09-25 RX ORDER — OXYTOCIN/0.9 % SODIUM CHLORIDE 40/1000ML
500 PLASTIC BAG, INJECTION (ML) INTRAVENOUS ONCE
Status: COMPLETED | OUTPATIENT
Start: 2023-09-25 | End: 2023-09-25

## 2023-09-25 RX ORDER — LIDOCAINE HYDROCHLORIDE 10 MG/ML
0-30 INJECTION, SOLUTION EPIDURAL; INFILTRATION; INTRACAUDAL; PERINEURAL ONCE AS NEEDED
Status: DISCONTINUED | OUTPATIENT
Start: 2023-09-25 | End: 2023-09-27 | Stop reason: HOSPADM

## 2023-09-25 RX ORDER — DIPHENHYDRAMINE HCL 50 MG/ML
25 VIAL (ML) INJECTION EVERY 6 HOURS PRN
Status: DISCONTINUED | OUTPATIENT
Start: 2023-09-25 | End: 2023-09-27 | Stop reason: HOSPADM

## 2023-09-25 RX ORDER — OXYTOCIN 10 [USP'U]/ML
10 INJECTION, SOLUTION INTRAMUSCULAR; INTRAVENOUS ONCE AS NEEDED
Status: DISCONTINUED | OUTPATIENT
Start: 2023-09-25 | End: 2023-09-27 | Stop reason: HOSPADM

## 2023-09-25 RX ORDER — AMOXICILLIN 250 MG
1 CAPSULE ORAL 2 TIMES DAILY
Status: DISCONTINUED | OUTPATIENT
Start: 2023-09-25 | End: 2023-09-27 | Stop reason: HOSPADM

## 2023-09-25 RX ORDER — IBUPROFEN 600 MG/1
600 TABLET ORAL EVERY 6 HOURS PRN
Status: DISCONTINUED | OUTPATIENT
Start: 2023-09-25 | End: 2023-09-27 | Stop reason: HOSPADM

## 2023-09-25 RX ORDER — FENTANYL/ROPIVACAINE/NS/PF 2-1500 MCG
PREFILLED PUMP RESERVOIR INJECTION CONTINUOUS
Status: DISCONTINUED | OUTPATIENT
Start: 2023-09-25 | End: 2023-09-27 | Stop reason: HOSPADM

## 2023-09-25 RX ORDER — POLYETHYLENE GLYCOL 3350 17 G/17G
17 POWDER, FOR SOLUTION ORAL DAILY PRN
Status: DISCONTINUED | OUTPATIENT
Start: 2023-09-25 | End: 2023-09-27 | Stop reason: HOSPADM

## 2023-09-25 RX ORDER — EPHEDRINE SULFATE/0.9% NACL/PF 50 MG/5 ML
10 SYRINGE (ML) INTRAVENOUS EVERY 10 MIN PRN
Status: DISCONTINUED | OUTPATIENT
Start: 2023-09-25 | End: 2023-09-25

## 2023-09-25 RX ORDER — DIBUCAINE 1 %
1 OINTMENT (GRAM) TOPICAL AS NEEDED
Status: DISCONTINUED | OUTPATIENT
Start: 2023-09-25 | End: 2023-09-27 | Stop reason: HOSPADM

## 2023-09-25 RX ORDER — OXYTOCIN/0.9 % SODIUM CHLORIDE 40/1000ML
PLASTIC BAG, INJECTION (ML) INTRAVENOUS CONTINUOUS
Status: DISPENSED | OUTPATIENT
Start: 2023-09-25 | End: 2023-09-26

## 2023-09-25 RX ORDER — CALCIUM CARBONATE 200(500)MG
500 TABLET,CHEWABLE ORAL EVERY 4 HOURS PRN
Status: DISCONTINUED | OUTPATIENT
Start: 2023-09-25 | End: 2023-09-27 | Stop reason: HOSPADM

## 2023-09-25 RX ORDER — ONDANSETRON 4 MG/1
4 TABLET, ORALLY DISINTEGRATING ORAL EVERY 8 HOURS PRN
Status: DISCONTINUED | OUTPATIENT
Start: 2023-09-25 | End: 2023-09-27 | Stop reason: HOSPADM

## 2023-09-25 RX ORDER — SODIUM CHLORIDE, SODIUM LACTATE, POTASSIUM CHLORIDE, CALCIUM CHLORIDE 600; 310; 30; 20 MG/100ML; MG/100ML; MG/100ML; MG/100ML
INJECTION, SOLUTION INTRAVENOUS CONTINUOUS
Status: DISCONTINUED | OUTPATIENT
Start: 2023-09-25 | End: 2023-09-27 | Stop reason: HOSPADM

## 2023-09-25 RX ADMIN — SODIUM CHLORIDE, POTASSIUM CHLORIDE, SODIUM LACTATE AND CALCIUM CHLORIDE 1000 ML: 600; 310; 30; 20 INJECTION, SOLUTION INTRAVENOUS at 08:37

## 2023-09-25 RX ADMIN — Medication 10 MG: at 15:19

## 2023-09-25 RX ADMIN — Medication 50 ML: at 13:57

## 2023-09-25 RX ADMIN — Medication 5 MG: at 16:46

## 2023-09-25 RX ADMIN — Medication 2 MILLI-UNITS/MIN: at 09:56

## 2023-09-25 RX ADMIN — SODIUM CHLORIDE, POTASSIUM CHLORIDE, SODIUM LACTATE AND CALCIUM CHLORIDE 125 ML/HR: 600; 310; 30; 20 INJECTION, SOLUTION INTRAVENOUS at 13:59

## 2023-09-25 RX ADMIN — LIDOCAINE HYDROCHLORIDE,EPINEPHRINE BITARTRATE 5 ML: 15; .005 INJECTION, SOLUTION EPIDURAL; INFILTRATION; INTRACAUDAL; PERINEURAL at 13:52

## 2023-09-25 RX ADMIN — ACETAMINOPHEN 975 MG: 325 TABLET ORAL at 15:58

## 2023-09-25 RX ADMIN — OXYTOCIN: 10 INJECTION INTRAVENOUS at 20:10

## 2023-09-25 RX ADMIN — Medication 50 ML: at 19:25

## 2023-09-25 RX ADMIN — OXYTOCIN 20 UNITS: 10 INJECTION INTRAVENOUS at 19:36

## 2023-09-25 ASSESSMENT — COGNITIVE AND FUNCTIONAL STATUS - GENERAL: DO YOU HAVE SERIOUS DIFFICULTY WALKING OR CLIMBING STAIRS: NO

## 2023-09-25 NOTE — PROGRESS NOTES
Attg Note    Pt seen   She is overall comfortable  She was started on Pitocin this morning and started to get uncomfortable with contractions but reports they fizzled out a bit after AROM  FHT is cat1, toco q2-3 minutes  Pitocin is on at 8U  Explained to Celine that she is just not in labor yet,   We will continue to increase Pitocin as needed  Anticipate   All questions answered    Madison Mcbride MD

## 2023-09-25 NOTE — PROGRESS NOTES
LANDON note    Visit Vitals  BP (!) 91/50   Pulse 79   Temp 36.9 °C (98.4 °F) (Oral)   Resp 16   LMP 12/24/2022   SpO2 99%     FHTs: recurrent late decelerations reassured by moderate variability  [place]: contractions q 3    Pitocin at 16 cody-units/min      10 mg ephedrine given per MAR at 1519 for subtle late decels noted on tracing, likely 2/2 to maternal BP.  Will continue to closely monitor and continue maternal position changes.  Can consider addition dose of ephedrine and fluid bolus if unresolved.      Discussed with Dr. Mcbride.    LANDON Almanza  09/25/23, 3:29 PM

## 2023-09-25 NOTE — PROGRESS NOTES
Attg note    Pt seen on LD  She has overall been feeling well throughout the day, recently just had a slight vagal reaction to laying flat for bladder drainage and became diaphoretic. She was given 5mg ephedrine for hypotension 90s/50s, and is now feeling much better  FHT has been cat1  toco q4 mins, pitocin on at 18  I just rechecked cervix, now 5/70/-2  Baby is LOT  We will peanut ball to help head hopefully turn to OA  I had discussed IUPC with patient to help with pitocin titration but given exam is now changed from prior, I am hoping she is finally kicking into labor and we will hold off for now.   Pt is aware that Dr Olmstead is taking over care rohit Mcbride MD

## 2023-09-25 NOTE — L&D DELIVERY NOTE
OB Vaginal Delivery Note    Delivery: 2023 at 7:28 PM   Patient: Celine Engle  : 1985     Review the Delivery Report for details.     Delivery Details    Pre-Op Diagnosis: 1. 38 y.o.  intrauterine pregnancy at 39w2d with hare gestation.  2. GBS negative  3. eIOL   Post-Op Diagnosis: 1. Same as above with delivery of viable female    Delivery Clinician: Pedro Olmstead    Delivery Assist;Delivery Nurse;Nursery Nurse  Christie Lala;Clari Chu;Aurea Pierce    Delivery Type: Spontaneous vaginal delivery   Labor Complications: None    EBL: 200  mL   Anesthesia Type: Epidural    Placenta Delivery Spontaneous    Placenta Disposition: discarded    Additional Specimens None      INFANT INFORMATION  Time of Birth: 7:28 PM   Presentation: Vertex   Position: Left Occiput Anterior   Cord: 3 vessels  Complications: None    Sex: female   [place] Weight: 2.855 kg (6 lb 4.7 oz)      1 Minute 5 Minute   Apgar Totals: 8   9         Celine Engle is a 38 y.o.  at 39w2d gestation who presented to the hospital for eIOL.   On admission cervix was 4 cm dilated, induction was begun with amniotomy. Pitocin was started and uptitrated per protocol, and epidural was placed per maternal request for analgesia.  The patient progressed to fully dilated and pushed for 2 minutes.  With both resident and attending at the bedside, the patient delivered a viable female  from the dorsal lithotomy position at 1928.  Upon delivery of the head, there was no nuchal cord noted in need of reduction.  The anterior shoulder, which was the right, delivered with ease, and the posterior shoulder followed.  Upon delivery of the infant, a spontaneous cry was heard, and the infant appeared to be moving all four extremities.  Tactile stimulation and bulb suction were performed.  The infant was placed on the maternal abdomen under direct nursing supervision.  The umbilical cord was  clamped twice after 60 seconds and cut in between.    Attention was then turned to the placenta, which was delivered spontaneously shortly thereafter. It was inspected and felt to be complete with a three-vessel cord.  Upon thorough inspection of the vagina, perineum, and cervix, there was a no laceration noted in need of repair.   At the completion of the case, all sponges, and instruments were removed from the vagina.  All sponge counts were found to be correct.  The uterine fundus was massaged, felt to be firm, and lochia was within normal limits.  The patient tolerated the procedure well and was left with the infant in the delivery room to recover in stable condition.     Dr. Olmstead was present and scrubbed for the delivery.    Christie Lala DO

## 2023-09-25 NOTE — PROGRESS NOTES
Labor and Delivery Progress Note    Subjective     Interval History: none.     Patient comfortable with epidural    Objective     Vital Signs for the last 24 hours:  Temp:  [36.7 °C (98.1 °F)-36.9 °C (98.4 °F)] 36.9 °C (98.4 °F)  Heart Rate:  [] 85  Resp:  [16] 16  BP: ()/(53-63) 90/53     Fetal Monitoring:  FHR Baseline: 145  FHR Variability: moderate  FHR Accelerations: present  FHR Decelerations: non-recurrent variable decelerations    Contraction Frequency: q2,5-3 min    IUPC: no    Latest cervical exam:  Cervical Dilation (cm): 4  Cervical Effacement: 50  Fetal Station: -2  Method: sterile exam per RN (jayashree Ramesh) (23)    Vaginal Bleeding: not present (23)      Current Facility-Administered Medications:     ePHEDrine sulfate-0.9%NaCl(PF) 50 mg/5 mL (10 mg/mL) injection 10 mg, 10 mg, intravenous, q10 min PRN, Carolina Hartman MD    fentaNYL-ropivacaine-NaCl (PF) 2 mcg/mL - 0.15% PCEA syringe, , epidural, Continuous, Carolina Hartman MD, Last Rate: 10 mL/hr at 23 1357, 50 mL at 23    lactated ringer's bolus 500 mL, 500 mL, intravenous, Once, Carolina Hartman MD    [COMPLETED] lactated ringer's bolus 1,000 mL, 1,000 mL, intravenous, Once, Last Rate: 4,000 mL/hr at 23 0837, 1,000 mL at 23 0837 **FOLLOWED BY** lactated ringer's infusion, , intravenous, Continuous, Mandy Crum MD    lactated ringer's infusion, 125 mL/hr, intravenous, Continuous, Last Rate: 125 mL/hr at 23 1359, 125 mL/hr at 23 135 **AND** oxytocin in 0.9 % NSS (PITOCIN) 30 unit/500 mL infusion, 0-20 cody-units/min, intravenous, Continuous, Gladys Smith MD, Last Rate: 12 mL/hr at 23 1435, 12 cody-units/min at 23 1435    Assessment/Plan     Celine Engle is a 38 y.o. female  at 39w2d admitted with induction of labor, elective    1. FHR: Category II for non-recurrent variables, reassured by moderate variability and  accelerations.  Continue maternal position changes.  Patient moved to left lateral and peanut ball to be placed.  Will continue to closely monitor.  2. GBS: negative  3. IOL: pitocin at 10 cody-units/min, patient largely comfortable with epidural.  CVE unchanged from prior exam.  Contractions q 2.5-3 min.  Continue pitocin titration per protocol.  Recheck in 2 hours or PRN    Discussed with LANDON Estrada

## 2023-09-25 NOTE — PROGRESS NOTES
Labor and Delivery Progress Note    Subjective     Patient tolerating contractions without epidural.      Objective     Vitals:  Temp:  [36.7 °C (98.1 °F)] 36.7 °C (98.1 °F)  Heart Rate:  [80] 80  Resp:  [16] 16  BP: (104)/(63) 104/63    Fetal Monitoring:  FHR Baseline: 140  FHR Variability: moderate  FHR Accelerations: present  FHR Decelerations: absent     Contraction Frequency: q2-4min    Cervical Exam:   Cervical Dilation (cm): 4  Cervical Effacement: 50  Fetal Station: -2  Method: sterile exam per resident (Radames) (23 1130)      Assessment/Plan     Celine Engle is a 38 y.o.  at 39w2d admitted for eIOL.    1. FHT: Category I.  2. GBS: Negative.  3. IOL: CVE 4/50/-2. AROM for clear fluid on this exam. Pitocin on at 6 mu/min. Continue to monitor FHT/[place]. Will reassess patient in 2 hours or sooner if needed.      Discussed with Dr. Reed Crum MD

## 2023-09-25 NOTE — ANESTHESIOLOGIST PRE-PROCEDURE ATTESTATION
Pre-Procedure Patient Identification:  I am the Primary Anesthesiologist and have identified the patient on 09/25/23 at 1:32 PM.   I have confirmed the procedure(s) will be performed by the following surgeon/proceduralist * No surgeons listed *.

## 2023-09-25 NOTE — PROGRESS NOTES
Labor and Delivery Progress Note    Subjective     Patient resting comfortably with epidural.      Objective     Vitals:  Temp:  [36.7 °C (98.1 °F)-37 °C (98.6 °F)] 37 °C (98.6 °F)  Heart Rate:  [] 78  Resp:  [16] 16  BP: ()/(50-63) 91/61    Fetal Monitoring:  FHR Baseline: 150  FHR Variability: moderate  FHR Accelerations: present  FHR Decelerations: nonrecurrent variable decelerations    Contraction Frequency: q2-3min    Cervical Exam:   Cervical Dilation (cm): 7  Cervical Effacement: 80  Fetal Station: -1  Method: sterile exam per resident (omar) (23)      Assessment/Plan     Celine Engle is a 38 y.o.  at 39w2d admitted for eIOL.    1. FHT: Category II. 2/2 nonrecurrent variable decelerations, reassured by moderate variability, accels, and cervical change  2. GBS: Negative.  3. IOL: CVE 7/80/-1. Contractions every 2-3 mins. Pitocin on at 20 mU/min. Will continue to monitor FHT/[place]. Will reassess in 2 hours or PRN.      Discussed with Dr Ishan Lala DO

## 2023-09-25 NOTE — PROGRESS NOTES
Attg note    Pt seen on LD this AM   @ 39 2/7 weeks presents for IOL 2/2 AMA  She is comfortable and not perceiving contractions  Has had  x 3  FHT is reactive, toco irregular  Cervix /3  She prefers AROM to see if she progresses into spontaneous labor before initiation of pitocin   Would be agreeable to pitocin if labor does not then progress  Will obtain IV access/admission labs and then arom, recheck in 3 years. Start pitocin if unchanged at that point  GBS neg  Pt comfortable with this plan    Madison Mcbride MD

## 2023-09-25 NOTE — PROGRESS NOTES
R1OB     Given AROM not safe at this time will start patients induction with pitocin. FHT  Category 1. Will reassess in 2 hours or sooner if needed    D/w Dr. Reed Smith MD

## 2023-09-25 NOTE — PROGRESS NOTES
Labor and Delivery Progress Note    Subjective     Patient comfortable in bed without an epidural .      Objective     Vitals:  Temp:  [36.7 °C (98.1 °F)] 36.7 °C (98.1 °F)  Heart Rate:  [80] 80  Resp:  [16] 16  BP: (104)/(63) 104/63    Fetal Monitoring:  FHR Baseline: 155  FHR Variability: moderate  FHR Accelerations: present  FHR Decelerations: absent    Contraction Frequency: toco is quiet    Cervical Exam:   Cervical Dilation (cm): 4  Cervical Effacement: 50  Fetal Station: -2  Method: sterile exam per resident (NARAYAN [place]) (23 0803)      Assessment/Plan     Celine Engle is a 38 y.o.  at 39w2d admitted for eIOL.    1. FHT: Reactive.  2. GBS: Negative.  3. IOL: CVE 4/50/-2. [place] is quiet. Deferred AROM at this time due to poor exam. Will continue to monitor and will reassess in 1-2 hours or sooner if needed.       To be discussed with Dr. Reed Smith MD

## 2023-09-25 NOTE — ANESTHESIA PREPROCEDURE EVALUATION
Anesthesia ROS/MED HX      Hematological    anemia  Endo/Other  Body Habitus: Normal  ROS/MED HX Comments:    ROS/Hx: 38 y.o.  at 39w2d, admitted for elective induction of labor.       Relevant Problems   NEUROLOGY   (+) Anxiety       Physical Exam    Airway   Mallampati: II   TM distance: >3 FB   Neck ROM: full  Cardiovascular - normal   Rhythm: regular   Rate: normalPulmonary - normal   clear to auscultation  Dental - normal        Anesthesia Plan    Plan: labor epidural    Technique: epidural     Lines and Monitors: PIV     Airway: natural airway / supplemental oxygen       patient did not smoke on day of surgery   2 ASA  Blood Products:   Use of Blood Products Discussed: No   Anesthetic plan and risks discussed with: patient and spouse  Postop Plan:   Patient Disposition: inpatient floor planned admission    Patient Active Problem List   Diagnosis    Abnormal serum thyroid stimulating hormone (TSH) level    Anxiety    Chiari malformation type I (CMS/HCC)    Term pregnancy    Normal pregnancy, unspecified trimester       Current Facility-Administered Medications   Medication Dose Route Frequency    lactated ringer's   intravenous Continuous    lactated ringer's  125 mL/hr intravenous Continuous    And    oxytocin in 0.9 % NSS  0-20 cody-units/min intravenous Continuous       Prior to Admission medications    Medication Sig Start Date End Date Taking? Authorizing Provider   ferrous sulfate ER (SLOW IRON) 140 mg (45 mg iron) tablet Take 1 tablet (140 mg total) by mouth 2 (two) times a day with meals. 23   Pedro Olmstead MD   prenatal vit no.130-iron-folic 27 mg iron- 800 mcg tablet tablet Take 1 tablet by mouth daily.    Provider, Lc Kaba MD   pantoprazole (PROTONIX) 40 mg EC tablet Take 1 tablet (40 mg total) by mouth daily. 23  Jean Alvarenga MD   ursodioL (ActigalL) 300 mg capsule Take 1 capsule (300 mg total) by mouth 2 (two) times a day. 23   Lorena Miller MD       CBC Results       09/25/23 08/03/23 07/05/23     0818 0839 1119    WBC 5.91 6.2 6.0    RBC 3.81 3.49 3.30    HGB 11.0 10.5 9.8    HCT 34.8 32.4 30.2    MCV 91.3 93 92    MCH 28.9 30.1 29.7    MCHC 31.6 32.4 32.5     169 197          BMP Results       09/09/23     1243        K 4.2    Cl 105    CO2 25    Glucose 83    BUN 5    Creatinine 0.5    Calcium 8.5    Anion Gap 6    EGFR >60.0          Lab Results   Component Value Date    HCGPREGUR Positive (A) 02/19/2022             No orders to display

## 2023-09-25 NOTE — ANESTHESIA PROCEDURE NOTES
Epidural Block    Patient location during procedure: OB  Start time: 9/25/2023 1:52 PM  Reason for block: labor analgesia requested by patient and obstetrician  Staffing  Performed: anesthesiologist   Anesthesiologist: Carolina Hartman MD  Performed by: Carolina Hartman MD  Authorized by: Carolina Hartman MD    Preanesthetic Checklist  Completed: patient identified, surgical consent, pre-op evaluation, timeout performed, IV checked, risks and benefits discussed, monitors and equipment checked and sterile field maintained during procedure  Needle  Needle type: Tuohy   Needle gauge: 17 G  Needle length: 3.5 in  Needle insertion depth: 5 cm  Catheter type: Single orifice  Catheter size: 19 G  Catheter at skin depth: 11 cm  Test dose: negative and lidocaine 1.5% with epinephrine 1-to-200,000  Additional Notes  Procedure well tolerated. Vital signs stable. No paresthesia.  Analgesia satisfactory. Patients nurse to notify anesthesiologist if hemodynamically unstable.    Medications Administered -   lidocaine 1.5%-EPINEPHrine 1:200,000 PF (XYLOCAINE W/EPI) injection - epidural   5 mL - 9/25/2023 1:52:00 PM

## 2023-09-25 NOTE — H&P
HPI     Celine Engle is a 38 y.o.  at 39w2d with an estimated due date of 2023, by Last Menstrual Period c/w 9 week ultrasound who presents for an elective induction of labor.  She had a cervical exam on  on labor and delievery and was found to be 4/0/-2. She denies contractions , leakage of fluid, and vaginal bleeding. Ednorses fetal movement.      Of note, she has a history of 3 prior full term spontaneous vaginal deliveries most recently in .  Her largest baby was 8#10.      Pregnancy complicated by:  1. AMA: NIPT within normal limits. PTC ultrasound   EFW 2959  gm   6 lb 8 oz    27   %  2. Anemia: most recent Hgb 10.5 on 8/3. Taking PO Fe.  3. Subclinical hypothyroidism:  TFTs on 3/7 TSH 0.146, FT4 1.32 otherwise within normal limits.Ppatient is asymptomatic. Requires no medication. Will be for repeat TFTS post partum.   4. Anxiety:  Reports stable mood, no meds. Denies SI/HI and self harming behavior. Previously on lexapro.   5. Isolated Elevated blood pressure: Patient had a mild range BP at 18w4d on 5/3/23. Reports that she has otherwise been normotensive throughout this pregnancy      OB History:   OB History    Para Term  AB Living   8 3 3 0 4 3   SAB IAB Ectopic Multiple Live Births   2 1 1 0 3      # Outcome Date GA Lbr Luis Alberto/2nd Weight Sex Delivery Anes PTL Lv   8 Current            7 Term 2017   3.714 kg (8 lb 3 oz) M   N    6 Term    3.912 kg (8 lb 10 oz) M   N    5 Term    3.374 kg (7 lb 7 oz) M   N    4 Ectopic            3 SAB            2 SAB      D&E      1 IAB      D&E           GYN History: Denies history of fibroids, cysts and polyps.    Medical History:   Past Medical History:   Diagnosis Date    Anxiety     Chiari malformation type I (CMS/HCC)     follows w/neuro, stable since     H/O migraine     Herpes     Shingles     Ovarian cyst     UTI (urinary tract infection)      Denies history of: asthma, kidney disease,  liver disease, seizures, hypertension and diabetes.    Surgical History:   Past Surgical History:   Procedure Laterality Date    DILATION AND CURETTAGE, DIAGNOSTIC / THERAPEUTIC      WISDOM TOOTH EXTRACTION         Allergies: Patient has no known allergies.    Home Medications:   Prior to Admission medications    Medication Sig Start Date End Date Taking? Authorizing Provider   ferrous sulfate ER (SLOW IRON) 140 mg (45 mg iron) tablet Take 1 tablet (140 mg total) by mouth 2 (two) times a day with meals. 7/6/23   Pedro Olmstead MD   pantoprazole (PROTONIX) 40 mg EC tablet Take 1 tablet (40 mg total) by mouth daily. 7/11/23 1/7/24  Jean Alvarenga MD   prenatal vit no.130-iron-folic 27 mg iron- 800 mcg tablet tablet Take 1 tablet by mouth daily.    Provider, Lc Kaba MD   ursodioL (ActigalL) 300 mg capsule Take 1 capsule (300 mg total) by mouth 2 (two) times a day. 9/9/23 11/8/23  Lorena Miller MD         Objective     Vital Signs (last 24h):  Temp:  [36.7 °C (98.1 °F)] 36.7 °C (98.1 °F)  Heart Rate:  [80] 80  Resp:  [16] 16  BP: (104)/(63) 104/63    Cervical Exam:  Cervical Dilation (cm): 4  Cervical Effacement: 50  Fetal Station: -2  Method: sterile exam per physician (Reed) (09/25/23 6907)        Electronic Fetal Monitoring:  FHR Baseline: 155  FHR Variability: moderate  FHR Accelerations: present  FHR Decelerations: absent    Tocometer:  Contraction Frequency: infrequently on toco.    Exam:  General appearance: alert, no acute distress  Cardiac: normal heart sounds  Pulmonary: clear to auscultation bilaterally, no increased respiratory effort  Abdomen: gravid, nontender  Female genitalia: see cervical exam  Extremities: no lower extremity edema     Bedside Ultrasounds:   cephalic    Labs:  · Blood type: O+   · RPR: negative   · Syphilis Antibody: negative   · HepBsAg: negative   · Hep C Ab: negative   · Rubella: immune   · Rubeola: not done   · Varicella: immune    · HIV: negative   · Glucose  tolerance testing: normal   · Group B Strep: negative   · Gonorrhea: negative   · Chlamydia: negative         Assessment/Plan     Celine Engle is a 38 y.o.  at 39w2d, admitted for elective induction of labor.     1. IOL: CVE /-2 unchanged from prior exam.  [place] infrequently on toco. Will admit to labor and delivery, obtain IV access and obtain routine labs. Will begin induction with an amniotomy. Will continue to monitor and will reassess in 3 hours or sooner if needed.   2. FHT: Reactive.  3. GBS: Negative.      Discussed with Dr. Mcbride .    Gladys Smith MD

## 2023-09-25 NOTE — LETTER
MARILYN KUMAR [facility]  [place] [facility]       [facility]  POSTPARTUM/NICU REFERRAL FORM  Referral is for: [x]  Mom & Baby []  Mom only  []  Baby only  [] Please check box if Patients Insurance is an IBC Product - (IBC/[facility]/Personal Choice)  All IBC must be a Mom & Baby Referral - Baby Only Cannot be Accepted under IBC              CI Process First and Enter as Routine Care Type with ICD-10 code Z39.2 for Mom and Z76.2 for Baby   All [facility] Patients Must Have a Script - CI Enter as High Priority Care Type         Referring Facility: [facility] Date of Referral: 23    Person Sending Referral:Saida Palomo RN      Phone #: [phone]  Patient's Last Name: Oniel            Patients First Name: Celine  : 1985    Insurance: BLUE CROSS BLUE SHIELD         Insurance ID: [id number]  Street Address:  [address] [place] [address]     City: [place]      Zip Code: [address]    Apartment Number:  Cell Phone: [phone]  (Home Phone)   2nd Phone Number:     Name of Emergency Contact:  OnielJean (Spouse) Emergency Contact Number: [phone] (Mobile)    Delivery Date:   [x]        []C/S        [] Male     [x]Female      GTPAL:   Discharge Date:     Birth Wt: 6 lbs 4.7 oz        Discharge Wt:   Baby's REAL Name:    Baby born at what gestational age: 39w 2d  Obstetrician: Pedro Olmstead                                          Pediatrician:   Hans:      Routine Mom Diagnosis                 Routine [place] Diagnosis  [x]  Routine Mom (Z39.2)                 [x] Routine baby (Z76.2)  []  Postpartum Hemorrhage (O72.2)                   []  Endometritis (O86.12)                        BILIRUBINS - High Priority Diagnosis  []  Gestational Diabetes Mellitus (O24.439)                [] Bilirubin Level  Jaundice (P59.9)           [] COVID Positive Testing Date: ___________ (U07.1)                   Date of bili draw:_______________                                                                                                                      *Prescription required for ALL Bilirubins -                                                                                                                     will need high  priority care type (Except IBC Insured)              High Priority Mom Diagnosis  (Except IBC Insured)            High Priority  Diagnosis  (Except IBC Insured)  []  Cognitive Deficit (R41.841)               [] NICU Baby D/C Date: __________  []  Drug Dependence (O99.234)     []  Weight Check/Feeding Problems of : (P92.9)           []  Hypertension (O16.5)             Date needed: ___________   []  Pre-Eclampsia (O14.95)     []  ANAI baby (P96.1)               []  Lovenox/Coagulation Defects/Blood Clots (O72.3)  [] Bronchopulmonary Dysplasia/BPD (P27.1)   []  Symphysis Pubis (O26.73)     [] Hypoxic Ischemic Encephalopathy/HIE (P91.60)          []  Hypoglycemia (P70.4)               []   (P07.30)    Additional Information:   PLEASE FAX COMPLETE FORM AND ANY REQUIRED PRESCRIPTION TO  SENDY INTAKE: [phone]

## 2023-09-26 LAB
ERYTHROCYTE [DISTWIDTH] IN BLOOD BY AUTOMATED COUNT: 14.3 % (ref 11.7–14.4)
HCT VFR BLDCO AUTO: 31.9 % (ref 35–45)
HGB BLD-MCNC: 10.2 G/DL (ref 11.8–15.7)
MCH RBC QN AUTO: 28.9 PG (ref 28–33.2)
MCHC RBC AUTO-ENTMCNC: 32 G/DL (ref 32.2–35.5)
MCV RBC AUTO: 90.4 FL (ref 83–98)
PDW BLD AUTO: 10.5 FL (ref 9.4–12.3)
PLATELET # BLD AUTO: 182 K/UL (ref 150–369)
RBC # BLD AUTO: 3.53 M/UL (ref 3.93–5.22)
WBC # BLD AUTO: 9.11 K/UL (ref 3.8–10.5)

## 2023-09-26 PROCEDURE — 12000000 HC ROOM AND CARE MED/SURG

## 2023-09-26 PROCEDURE — 36415 COLL VENOUS BLD VENIPUNCTURE: CPT

## 2023-09-26 PROCEDURE — 63700000 HC SELF-ADMINISTRABLE DRUG

## 2023-09-26 PROCEDURE — 85027 COMPLETE CBC AUTOMATED: CPT

## 2023-09-26 RX ADMIN — BENZOCAINE AND LEVOMENTHOL: 200; 5 SPRAY TOPICAL at 01:34

## 2023-09-26 RX ADMIN — IBUPROFEN 600 MG: 600 TABLET, FILM COATED ORAL at 18:19

## 2023-09-26 RX ADMIN — SENNOSIDES AND DOCUSATE SODIUM 1 TABLET: 50; 8.6 TABLET ORAL at 10:00

## 2023-09-26 RX ADMIN — DIBUCAINE 1% 1 APPLICATION: 1 CREAM TOPICAL at 01:34

## 2023-09-26 RX ADMIN — SENNOSIDES AND DOCUSATE SODIUM 1 TABLET: 50; 8.6 TABLET ORAL at 20:10

## 2023-09-26 RX ADMIN — IBUPROFEN 600 MG: 600 TABLET, FILM COATED ORAL at 06:37

## 2023-09-26 RX ADMIN — IBUPROFEN 600 MG: 600 TABLET, FILM COATED ORAL at 01:15

## 2023-09-26 RX ADMIN — PRENATAL VITAMINS-IRON FUMARATE 27 MG IRON-FOLIC ACID 0.8 MG TABLET 1 TABLET: at 10:00

## 2023-09-26 RX ADMIN — ACETAMINOPHEN 650 MG: 325 TABLET ORAL at 20:10

## 2023-09-26 RX ADMIN — IBUPROFEN 600 MG: 600 TABLET, FILM COATED ORAL at 12:11

## 2023-09-26 ASSESSMENT — PAIN SCALES - GENERAL: PAIN_LEVEL: 0

## 2023-09-26 NOTE — ANESTHESIA POSTPROCEDURE EVALUATION
Patient: Celine Engle    Procedure Summary     Date: 09/25/23 Room / Location:     Anesthesia Start: 1345 Anesthesia Stop: 1928    Procedure: Labor Analgesia Diagnosis:     Scheduled Providers:  Responsible Provider: Carolina Hartman MD    Anesthesia Type: labor epidural ASA Status: 2          Anesthesia Type: labor epidural  PACU Vitals    No data found in the last 10 encounters.           Anesthesia Post Evaluation    Pain score: 0  Pain management: adequate  Patient location during evaluation: floor  Patient participation: complete - patient participated  Level of consciousness: awake and alert  Cardiovascular status: acceptable  Airway Patency: adequate  Respiratory status: acceptable  Hydration status: acceptable  Anesthetic complications: no

## 2023-09-26 NOTE — PLAN OF CARE
Care Coordination Discharge Plan Note     Discharge Needs Assessment  Concerns to be Addressed: care coordination/care conferences, discharge planning  Current Discharge Risk:      Anticipated Discharge Plan  Anticipated Discharge Disposition: home with assistance, home with home health  Type of Home Care Services: nursing        Patient Choice  Offered/Gave Vendor List: yes  Patient's Choice of Community Agency(s): [facility]    Patient and/or patient guardian/advocate was made aware of their right to choose a provider. A list of eligible providers was presented and reviewed with the patient and/or patient guardian/advocate in written and/or verbal form. The list delineates providers in the patients desired geographic area who are participating in the Medicare program and/or providers contracted with the patients primary insurance. The Medicare list and quality ratings were obtained from the Medicare.gov [medicare.gov] website.    ---------------------------------------------------------------------------------------------------------------------    Interdisciplinary Discharge Plan Review:  Participants:     Concerns Comments: CCRN met with SINGH and BRYCE at bedside, I introduced myself and explained the role of Care Coordination. Demographics and insurance info confirmed. I explained the services that are offered for Well Mom/Well Baby visits and provided a list of eligible homecare providers that offers these services.  Both SINGH and BRYCE chose [facility], referral faxed and [facility] homecare liaison notified regarding DCP. CC will continue to follow for emotional support and dispo planning until discharge is completed.    Discharge Plan:   Disposition/Destination:   /    Discharge Facility:    Community Resources:      Discharge Transportation:  Is Out of Hospital DNR needed at Discharge:    Does patient need discharge transport? No

## 2023-09-26 NOTE — PROGRESS NOTES
Obstetrics Vaginal Delivery Postpartum Progress Note    Events  Patient seen and examined.  No acute events overnight.    Subjective  Denies HA, CP, SOB, N/V and F/C.  No complaints.   Pain: well controlled   Bleeding: lochia minimal   Diet: taking regular diet   Bowel: passing flatus   Voiding: without difficulty   Ambulating: as tolerated     Vitals  Temp:  [36.4 °C (97.6 °F)-37 °C (98.6 °F)] 36.6 °C (97.8 °F)  Heart Rate:  [] 70  Resp:  [16-18] 18  BP: ()/(50-73) 86/54    Physical Exam  General: well and resting  Heart: Regular rate and rhythm  Lungs: Clear to auscultation bilaterally  Abdomen: soft, nondistended, non-tender  Fundus: firm, below umbilicus and nontender  Extremities: symmetric and no edema    Labs  Labs Reviewed:  Lab Results   Component Value Date    ABO O 2023    LABRH Positive 2023      Results from last 7 days   Lab Units 23  0818   WBC K/uL 5.91   RBC M/uL 3.81*   HEMOGLOBIN g/dL 11.0*   HEMATOCRIT % 34.8*   MCV fL 91.3   MCH pg 28.9   MCHC g/dL 31.6*   RDW % 14.2   PLATELETS K/uL 192   MPV fL 10.4     Rubella: immune  Rubella Antibodies, IgG   Date Value Ref Range Status   2023 5.17 Immune >0.99 index Final     Comment:                                     Non-immune       <0.90                                  Equivocal  0.90 - 0.99                                  Immune           >0.99       Syphilis Ab   Date Value Ref Range Status   2023 Nonreactive Nonreactive Final     RPR   Date Value Ref Range Status   2017 NEGATIVE (NEG) Final     Hepatitis B Surface Ag   Date Value Ref Range Status   2023 Nonreactive Nonreactive Final       Assessment/Plan   Problem-based Assessment and Plan    Celine Engle is a 38 y.o.  PPD#1 s/p .    1. Vitals: Stable.   2. Hemodynamics: Hgb 11.0 --> AM CBC pending.  Stable.  No signs or symptoms of acute anemia.    3. Pain: Currently well controlled.  Continue current medications.   4. VTE  assessment: Early Ambulation.   5. Vaccinations/Rhogam: Rhogam not indicated.   6. Postpartum care: Meeting appropriate postpartum milestones.   7. Anxiety: Mood currently stable without medication. Follow up EPDS.       Mandy Crum MD

## 2023-09-26 NOTE — LACTATION NOTE
Day 1 Vag. 4th baby, exclusively pumped for other 3. Patient wishes to exclusively pump and feed her infant expressed breastmilk/formula. Getting ~5mL with pumping.   Reviewed pumping strategy for exclusive pumping-  pump 8+ times in 24 hours/ every 3 hours day/evening and every 4 at night. Reviewed expected milk production and progression of milk volumes. Reviewed use of breast pump, cleaning of pieces, and milk storage guidelines.   Renting hospital grade pump, Has [employer] Insurance and cannot get pump through [employer]. Script given for insurance pump. Questions answered and support provided. Will follow up as needed.

## 2023-09-27 VITALS
HEART RATE: 79 BPM | SYSTOLIC BLOOD PRESSURE: 103 MMHG | OXYGEN SATURATION: 98 % | TEMPERATURE: 98.1 F | RESPIRATION RATE: 18 BRPM | DIASTOLIC BLOOD PRESSURE: 58 MMHG

## 2023-09-27 PROBLEM — Z34.90 NORMAL PREGNANCY, UNSPECIFIED TRIMESTER: Status: RESOLVED | Noted: 2023-09-25 | Resolved: 2023-09-27

## 2023-09-27 PROCEDURE — 63700000 HC SELF-ADMINISTRABLE DRUG

## 2023-09-27 RX ADMIN — IBUPROFEN 600 MG: 600 TABLET, FILM COATED ORAL at 05:19

## 2023-09-27 RX ADMIN — PRENATAL VITAMINS-IRON FUMARATE 27 MG IRON-FOLIC ACID 0.8 MG TABLET 1 TABLET: at 10:00

## 2023-09-27 RX ADMIN — IBUPROFEN 600 MG: 600 TABLET, FILM COATED ORAL at 11:36

## 2023-09-27 NOTE — DISCHARGE SUMMARY
Inpatient Discharge Summary    BRIEF OVERVIEW  Admitting Provider: Madison Mcbride MD  Discharge Provider: Pedro Olmstead MD  Primary Care Physician at Discharge: Beatrice To [phone]     Admission Date: 2023     Discharge Date: 2023    Primary Discharge Diagnosis  Normal pregnancy, unspecified trimester      Discharge Disposition  Home     Discharge Medications     Medication List      CONTINUE taking these medications    prenatal vit no.130-iron-folic 27 mg iron- 800 mcg tablet tablet  Take 1 tablet by mouth daily.  Dose: 1 tablet        STOP taking these medications    ferrous sulfate  mg (45 mg iron) tablet  Commonly known as: SLOW IRON            Active Issues Requiring Follow-up  Issue: Postpartum care   Follow-up Appointments Arranged: No     Outpatient Follow-Up  Encounter Information    This patient does not currently have any appointments scheduled.         Test Results Pending at Discharge  Unresulted Labs (From admission, onward)    None          DETAILS OF HOSPITAL STAY    Presenting Problem/History of Present Illness  Normal pregnancy, unspecified trimester [Z34.90]  Celine Engle is a 38 y.o.  at 39w2d who presented for elective induction of labor. She delivered uncomplicated . Patient was discharged home in stable condition on PPD#2    Hospital Course/Operative Procedures Performed  None     Consults: none  Procedures:     Lucia Cody DO

## 2023-09-27 NOTE — PROGRESS NOTES
Obstetrics Postpartum Progress Note    Events  Pt seen/examined. No concerns today   Will sent paperwork for work to the office via the portal   Daughter has slightly elevated bilirubin and might have to stay additional night. Awaiting information from pediatrics team     Subjective  Pain: none   Bleeding: lochia appropriate   Diet: regular   Voiding: yes without difficulty   Bowel: passing flatus and has bowel movement   Ambulating: without assistance   Denies HA/CP/SOB. No N/V. Denies RUQ pain or changes in vision.    Vitals  Temp:  [36.2 °C (97.2 °F)-37.1 °C (98.7 °F)] 36.7 °C (98.1 °F)  Heart Rate:  [65-80] 79  Resp:  [18-20] 18  BP: ()/(52-66) 103/58    I&O  No intake or output data in the 24 hours ending 09/27/23 0823    Physical Exam  General: AA0x3, NAD  Heart: Regular rate   Lungs: In no acute distress  Abdomen: soft, nondistended, non-tender, no rebound/rigidity/guarding.  Fundus: firm and at umbilicus  Extremities: no edema  Neuro: deferred     Labs  Labs Reviewed:  O  CBC Results       09/26/23 09/25/23 08/03/23     0554 0818 0839    WBC 9.11 5.91 6.2    RBC 3.53 3.81 3.49    HGB 10.2 11.0 10.5    HCT 31.9 34.8 32.4    MCV 90.4 91.3 93    MCH 28.9 28.9 30.1    MCHC 32.0 31.6 32.4     192 169        Rubella: immune    Assessment/Plan     Problem-based Assessment and Plan    Celine Engle is a 38 y.o.  PPD#2 s/p Vaginal, Spontaneous .    1. Vital Signs: stable  2. Hemodynamics: stable, Hg 10.2  3. Pain: controlled  4. VTE Assessment: Early Ambulation  5. Vaccinations/Rhogam: rhogam not indicated   6. Post care: meeting all goals   Stable for discharge today. DC instructions reviewed, f/u office 6 weeks.         Lucia Cody DO

## 2023-09-27 NOTE — LACTATION NOTE
Patient for discharge today. Mom continues to pump for baby and offer bottles. Some redness and sore nipples. Reviewed sore nipple care, gave NSS. Reviewed discharge information including engorgement strategies, monitoring diaper outputs, feed every 3 hours/8x in 24 hours, and outpatient resources- encouraged f/u as needed. Questions answered and support provided.  Has personal pump for home.

## 2023-11-08 ENCOUNTER — OFFICE VISIT (OUTPATIENT)
Dept: OBSTETRICS AND GYNECOLOGY | Facility: CLINIC | Age: 38
End: 2023-11-08
Payer: COMMERCIAL

## 2023-11-08 VITALS — BODY MASS INDEX: 20.83 KG/M2 | DIASTOLIC BLOOD PRESSURE: 74 MMHG | WEIGHT: 133 LBS | SYSTOLIC BLOOD PRESSURE: 116 MMHG

## 2023-11-08 DIAGNOSIS — Z12.31 SCREENING MAMMOGRAM FOR BREAST CANCER: ICD-10-CM

## 2023-11-08 PROCEDURE — 0503F POSTPARTUM CARE VISIT: CPT | Performed by: OBSTETRICS & GYNECOLOGY

## 2023-11-08 NOTE — PROGRESS NOTES
2023  Subjective     Patient ID: Celine Engle is a 38 y.o. female.    HPI  37 yo  presents for pp visit s/p FTSVD complicated by SGA; low breast milk production; no pain or VB.   Review of Systems  All other negative   Objective     Vitals:    23 1342   BP: 116/74   BP Location: Left upper arm   Patient Position: Sitting   Weight: 60.3 kg (133 lb)     Body mass index is 20.83 kg/m².    Physical Exam  SE: perineum intact  VE: uterus nl size    Assessment/Plan   Diagnoses and all orders for this visit:    6 weeks postpartum follow-up (Primary)    Screening mammogram for breast cancer  -     BI SCREENING MAMMOGRAM BILATERAL(TOMOSYNTHESIS); Future      Follow up in 4-6 months for routine gyn annual exam

## 2023-12-20 ENCOUNTER — TRANSCRIBE ORDERS (OUTPATIENT)
Dept: RADIOLOGY | Age: 38
End: 2023-12-20

## 2023-12-20 DIAGNOSIS — Z12.31 ENCOUNTER FOR SCREENING MAMMOGRAM FOR MALIGNANT NEOPLASM OF BREAST: Primary | ICD-10-CM

## 2024-01-11 ENCOUNTER — HOSPITAL ENCOUNTER (OUTPATIENT)
Dept: RADIOLOGY | Age: 39
Discharge: HOME | End: 2024-01-11
Attending: OBSTETRICS & GYNECOLOGY
Payer: COMMERCIAL

## 2024-01-11 DIAGNOSIS — Z12.31 SCREENING MAMMOGRAM FOR BREAST CANCER: ICD-10-CM

## 2024-01-11 PROCEDURE — 77063 BREAST TOMOSYNTHESIS BI: CPT

## 2024-01-25 ENCOUNTER — TELEPHONE (OUTPATIENT)
Dept: OBSTETRICS AND GYNECOLOGY | Facility: CLINIC | Age: 39
End: 2024-01-25
Payer: COMMERCIAL

## 2024-12-28 ENCOUNTER — HOSPITAL ENCOUNTER (EMERGENCY)
Facility: HOSPITAL | Age: 39
Discharge: HOME | End: 2024-12-28
Attending: STUDENT IN AN ORGANIZED HEALTH CARE EDUCATION/TRAINING PROGRAM
Payer: COMMERCIAL

## 2024-12-28 VITALS
SYSTOLIC BLOOD PRESSURE: 106 MMHG | RESPIRATION RATE: 20 BRPM | OXYGEN SATURATION: 100 % | HEART RATE: 64 BPM | TEMPERATURE: 99.4 F | DIASTOLIC BLOOD PRESSURE: 67 MMHG

## 2024-12-28 DIAGNOSIS — R68.83 CHILLS: ICD-10-CM

## 2024-12-28 DIAGNOSIS — R39.9 URINARY TRACT INFECTION SYMPTOMS: Primary | ICD-10-CM

## 2024-12-28 DIAGNOSIS — M54.9 BACK PAIN, UNSPECIFIED BACK LOCATION, UNSPECIFIED BACK PAIN LATERALITY, UNSPECIFIED CHRONICITY: ICD-10-CM

## 2024-12-28 LAB
ALBUMIN SERPL-MCNC: 4.8 G/DL (ref 3.5–5.7)
ALP SERPL-CCNC: 48 IU/L (ref 34–125)
ALT SERPL-CCNC: 14 IU/L (ref 7–52)
ANION GAP SERPL CALC-SCNC: 7 MEQ/L (ref 3–15)
AST SERPL-CCNC: 20 IU/L (ref 13–39)
BACTERIA URNS QL MICRO: ABNORMAL /HPF
BASOPHILS # BLD: 0.04 K/UL (ref 0.01–0.1)
BASOPHILS NFR BLD: 0.5 %
BILIRUB SERPL-MCNC: 0.7 MG/DL (ref 0.3–1.2)
BILIRUB UR QL STRIP.AUTO: NEGATIVE MG/DL
BUN SERPL-MCNC: 18 MG/DL (ref 7–25)
CALCIUM SERPL-MCNC: 9.5 MG/DL (ref 8.6–10.3)
CHLORIDE SERPL-SCNC: 105 MEQ/L (ref 98–107)
CLARITY UR REFRACT.AUTO: CLEAR
CO2 SERPL-SCNC: 25 MEQ/L (ref 21–31)
COLOR UR AUTO: YELLOW
CREAT SERPL-MCNC: 1.1 MG/DL (ref 0.6–1.2)
DIFFERENTIAL METHOD BLD: ABNORMAL
EGFRCR SERPLBLD CKD-EPI 2021: >60 ML/MIN/1.73M*2
EOSINOPHIL # BLD: 0.07 K/UL (ref 0.04–0.36)
EOSINOPHIL NFR BLD: 0.9 %
ERYTHROCYTE [DISTWIDTH] IN BLOOD BY AUTOMATED COUNT: 12 % (ref 11.7–14.4)
GLUCOSE SERPL-MCNC: 110 MG/DL (ref 70–99)
GLUCOSE UR STRIP.AUTO-MCNC: NEGATIVE MG/DL
HCG UR QL: NEGATIVE
HCT VFR BLD AUTO: 35.5 % (ref 35–45)
HGB BLD-MCNC: 12 G/DL (ref 11.8–15.7)
HGB UR QL STRIP.AUTO: NEGATIVE
HYALINE CASTS #/AREA URNS LPF: ABNORMAL /LPF
IMM GRANULOCYTES # BLD AUTO: 0.01 K/UL (ref 0–0.08)
IMM GRANULOCYTES NFR BLD AUTO: 0.1 %
KETONES UR STRIP.AUTO-MCNC: NEGATIVE MG/DL
LEUKOCYTE ESTERASE UR QL STRIP.AUTO: NEGATIVE
LYMPHOCYTES # BLD: 1.63 K/UL (ref 1.2–3.5)
LYMPHOCYTES NFR BLD: 22 %
MCH RBC QN AUTO: 31.2 PG (ref 28–33.2)
MCHC RBC AUTO-ENTMCNC: 33.8 G/DL (ref 32.2–35.5)
MCV RBC AUTO: 92.2 FL (ref 83–98)
MONOCYTES # BLD: 0.62 K/UL (ref 0.28–0.8)
MONOCYTES NFR BLD: 8.4 %
MUCOUS THREADS URNS QL MICRO: ABNORMAL /LPF
NEUTROPHILS # BLD: 5.03 K/UL (ref 1.7–7)
NEUTS SEG NFR BLD: 68.1 %
NITRITE UR QL STRIP.AUTO: NEGATIVE
NRBC BLD-RTO: 0 %
PH UR STRIP.AUTO: 6 [PH]
PLATELET # BLD AUTO: 211 K/UL (ref 150–369)
PMV BLD AUTO: 11.2 FL (ref 9.4–12.3)
POTASSIUM SERPL-SCNC: 3.9 MEQ/L (ref 3.5–5.1)
PROT SERPL-MCNC: 7.3 G/DL (ref 6–8.2)
PROT UR QL STRIP.AUTO: ABNORMAL
RBC # BLD AUTO: 3.85 M/UL (ref 3.93–5.22)
RBC #/AREA URNS HPF: ABNORMAL /HPF
SODIUM SERPL-SCNC: 137 MEQ/L (ref 136–145)
SP GR UR REFRACT.AUTO: 1.01
SQUAMOUS URNS QL MICRO: ABNORMAL /HPF
UROBILINOGEN UR STRIP-ACNC: 0.2 EU/DL
WBC # BLD AUTO: 7.4 K/UL (ref 3.8–10.5)
WBC #/AREA URNS HPF: ABNORMAL /HPF

## 2024-12-28 PROCEDURE — 87591 N.GONORRHOEAE DNA AMP PROB: CPT | Performed by: STUDENT IN AN ORGANIZED HEALTH CARE EDUCATION/TRAINING PROGRAM

## 2024-12-28 PROCEDURE — 80053 COMPREHEN METABOLIC PANEL: CPT | Performed by: STUDENT IN AN ORGANIZED HEALTH CARE EDUCATION/TRAINING PROGRAM

## 2024-12-28 PROCEDURE — 99283 EMERGENCY DEPT VISIT LOW MDM: CPT

## 2024-12-28 PROCEDURE — 87510 GARDNER VAG DNA DIR PROBE: CPT | Performed by: STUDENT IN AN ORGANIZED HEALTH CARE EDUCATION/TRAINING PROGRAM

## 2024-12-28 PROCEDURE — 84703 CHORIONIC GONADOTROPIN ASSAY: CPT | Performed by: STUDENT IN AN ORGANIZED HEALTH CARE EDUCATION/TRAINING PROGRAM

## 2024-12-28 PROCEDURE — 81003 URINALYSIS AUTO W/O SCOPE: CPT | Performed by: STUDENT IN AN ORGANIZED HEALTH CARE EDUCATION/TRAINING PROGRAM

## 2024-12-28 PROCEDURE — 85025 COMPLETE CBC W/AUTO DIFF WBC: CPT | Performed by: STUDENT IN AN ORGANIZED HEALTH CARE EDUCATION/TRAINING PROGRAM

## 2024-12-28 PROCEDURE — 36415 COLL VENOUS BLD VENIPUNCTURE: CPT | Performed by: STUDENT IN AN ORGANIZED HEALTH CARE EDUCATION/TRAINING PROGRAM

## 2024-12-28 ASSESSMENT — ENCOUNTER SYMPTOMS
CHILLS: 1
FREQUENCY: 1
BACK PAIN: 1
DYSURIA: 1

## 2024-12-29 LAB
CANDIDA RRNA VAG QL PROBE: NOT DETECTED
G VAGINALIS RRNA GENITAL QL PROBE: NOT DETECTED
T VAGINALIS RRNA GENITAL QL PROBE: NOT DETECTED

## 2024-12-29 NOTE — ED PROVIDER NOTES
Emergency Medicine Note  HPI   HISTORY OF PRESENT ILLNESS     Patient is a 40 yo female with a history of anxiety, ovarian cyst, interstitial cystitis here for dysuria with urinary frequency and urgency x almost 2 weeks with chills, myalgias, and back pain. Initially diagnosed with UTI by PCP via urine culture showing pansensitive E. Coli for which she took a full course of macrobid with no relief. PCP recently then switched her to ciprofloxacin however symptoms continue. PCP recommended patient go to the ED to r/o pyelo. Patient denies any vaginal bleeding or abnormal vaginal discharge. Children are sick with URI, patient has no URI sx. Low suspicion for STI exposure.          Patient History   PAST HISTORY     Reviewed from Nursing Triage:       Past Medical History:   Diagnosis Date    Anxiety     Chiari malformation type I (CMS/[facility])     follows w/neuro, stable since 2019    H/O migraine     Herpes     Shingles 2023    Ovarian cyst 2012    UTI (urinary tract infection)        Past Surgical History   Procedure Laterality Date    D&E 6W N/A 2/11/2022    Performed by Sergei Black MD at [facility] OR [facility]    Dilation and curettage, diagnostic / therapeutic      [place] tooth extraction         No family history on file.    Social History     Tobacco Use    Smoking status: Never    Smokeless tobacco: Never   Vaping Use    Vaping status: Never Used   Substance Use Topics    Alcohol use: Not Currently    Drug use: Never         Review of Systems   REVIEW OF SYSTEMS     Review of Systems   Constitutional:  Positive for chills.   Genitourinary:  Positive for dysuria, frequency, pelvic pain and urgency. Negative for vaginal bleeding.   Musculoskeletal:  Positive for back pain.         VITALS     ED Vitals      Date/Time Temp Pulse Resp BP SpO2 Who   12/28/24 2151 -- 64 20 106/67 100 % SAP   12/28/24 1842 37.4 °C (99.4 °F) 74 18 130/78 99 % LTC                         Physical Exam   PHYSICAL EXAM     Physical Exam  Vitals and nursing  note reviewed.   Constitutional:       General: She is not in acute distress.     Comments: Uncomfortable but nontoxic appearing female   Cardiovascular:      Rate and Rhythm: Normal rate.   Pulmonary:      Effort: Pulmonary effort is normal.   Abdominal:      Tenderness: There is abdominal tenderness (mild suprapubic TTP). There is no right CVA tenderness or left CVA tenderness.   Neurological:      Mental Status: She is alert.           PROCEDURES     Procedures     DATA     Results       Procedure Component Value Units Date/Time    Bacterial Vaginosis Panel Vagina [[id number]] Collected: 12/28/24 2159    Specimen: Vaginal Swab Updated: 12/28/24 2250    Chlamydia/GC RNA:ThinPrep/Urine/Swab Cervical [[id number]] Collected: 12/28/24 2159    Specimen: Swab from Cervical Updated: 12/28/24 2250    Comprehensive metabolic panel [[id number]]  (Abnormal) Collected: 12/28/24 2011    Specimen: Blood, Venous Updated: 12/28/24 2108     Sodium 137 mEQ/L      Potassium 3.9 mEQ/L      Comment: Results obtained on plasma. Plasma Potassium values may be up to 0.4 mEQ/L less than serum values. The differences may be greater for patients with high platelet or white cell counts.        Chloride 105 mEQ/L      CO2 25 mEQ/L      BUN 18 mg/dL      Creatinine 1.1 mg/dL      Glucose 110 mg/dL      Calcium 9.5 mg/dL      AST (SGOT) 20 IU/L      ALT (SGPT) 14 IU/L      Alkaline Phosphatase 48 IU/L      Total Protein 7.3 g/dL      Comment: Test performed on plasma which typically contains approximately 0.4 g/dL more protein than serum.        Albumin 4.8 g/dL      Bilirubin, Total 0.7 mg/dL      eGFR >60.0 mL/min/1.73m*2      Comment: Calculation based on the Chronic Kidney Disease Epidemiology Collaboration (CKD-EPI) equation refit without adjustment for race.        Anion Gap 7 mEQ/L     Urinalysis with Reflex Culture [[id number]]  (Abnormal) Collected: 12/28/24 2012    Specimen: Urine, Clean Catch Updated: 12/28/24 2101    Narrative:      The  following orders were created for panel order Urinalysis with Reflex Culture.  Procedure                               Abnormality         Status                     ---------                               -----------         ------                     UA Reflex to Culture (Ma...[[id number]]  Abnormal            Final result               UA Microscopic[[id number]]               Abnormal            Final result                 Please view results for these tests on the individual orders.    UA Microscopic [[id number]]  (Abnormal) Collected: 12/28/24 2012    Specimen: Urine, Clean Catch Updated: 12/28/24 2101     RBC, Urine 0 TO 4 /HPF      WBC, Urine 0 TO 3 /HPF      Squamous Epithelial Rare /hpf      Hyaline Cast None Seen /lpf      Bacteria, Urine None Seen /HPF      Mucus Rare /LPF     hCG, serum, qualitative [[id number]]  (Normal) Collected: 12/28/24 2011    Specimen: Blood, Venous Updated: 12/28/24 2057     Preg Test, Serum Negative    UA Reflex to Culture (Macroscopic) [[id number]]  (Abnormal) Collected: 12/28/24 2012    Specimen: Urine, Clean Catch Updated: 12/28/24 2056     Color, Urine Yellow     Clarity, Urine Clear     Specific Gravity, Urine 1.014     pH, Urine 6.0     Leukocyte Esterase Negative     Comment: Results can be falsely negative due to high specific gravity, some antibiotics, glucose >3 g/dl, or WBC other than neutrophils.        Nitrite, Urine Negative     Protein, Urine Trace     Comment: Trace False Positive Protein can be seen with alkaline or highly buffered urines or urine with high specific gravity. Suggest clinical correlation.        Glucose, Urine Negative mg/dL      Ketones, Urine Negative mg/dL      Urobilinogen, Urine 0.2 EU/dL      Bilirubin, Urine Negative mg/dL      Blood, Urine Negative     Comment: The sensitivity of the occult blood test is equivalent to approximately 4 intact RBC/HPF.       CBC and differential [[id number]]  (Abnormal) Collected: 12/28/24 2011    Specimen:  Blood, Venous Updated: 12/28/24 2042     WBC 7.40 K/uL      RBC 3.85 M/uL      Hemoglobin 12.0 g/dL      Hematocrit 35.5 %      MCV 92.2 fL      MCH 31.2 pg      MCHC 33.8 g/dL      RDW 12.0 %      Platelets 211 K/uL      MPV 11.2 fL      Differential Type Auto     nRBC 0.0 %      Immature Granulocytes 0.1 %      Neutrophils 68.1 %      Lymphocytes 22.0 %      Monocytes 8.4 %      Eosinophils 0.9 %      Basophils 0.5 %      Immature Granulocytes, Absolute 0.01 K/uL      Neutrophils, Absolute 5.03 K/uL      Lymphocytes, Absolute 1.63 K/uL      Monocytes, Absolute 0.62 K/uL      Eosinophils, Absolute 0.07 K/uL      Basophils, Absolute 0.04 K/uL             Imaging Results    None         No orders to display       Scoring tools                                  ED Course & MDM   MDM / ED COURSE / CLINICAL IMPRESSION / DISPO     Medical Decision Making  Patient is a 38 yo female with a hx of interstitial cystitis in the past here for urinary symptoms. Considered in differential pyelo vs nephrolithiasis vs perinephric abscess vs endometriosis/ovarian cyst vs less likely pregnancy or STI/PID/TOA. See ED course below for further details of ED work up.      Amount and/or Complexity of Data Reviewed  Labs: ordered. Decision-making details documented in ED Course.        ED Course as of 12/28/24 2316   Sat Dec 28, 2024   2050 WBC: 7.40 [KK]   2215 I had a long discussion with patient and patient's mom (retired radiologist) via phone regarding her care. Patient's mom questioned the utility of a CT abd/pelvis. Her UA is now clear after 2 antibiotics (UA was previously E.coli + per patient) but patient remains with severe chills, back and abdominal pain, and dysuria/frequency/urgency that I cannot explain by an interstitial cystitis diagnosis alone. However given her normal WBC count and lack of true CVA tenderness do not feel strongly about pursuing CT scan tonight to r/o pyelo vs abscess vs stone vs adnexal pathology. Instead  we agreed to have patient monitor her sx over the next few days then revisit imaging if necessary. Patient is happy with this plan. [KK]      ED Course User Index  [KK] Jaqueline Mitchell MD     Clinical Impression      Urinary tract infection symptoms   Chills   Back pain, unspecified back location, unspecified back pain laterality, unspecified chronicity     _________________       ED Disposition   Discharge                       Jaqueline Mitchell MD  12/28/24 1287

## 2024-12-30 LAB
C TRACH RRNA SPEC QL NAA+PROBE: NEGATIVE
N GONORRHOEA RRNA SPEC QL NAA+PROBE: NEGATIVE

## 2025-02-18 ENCOUNTER — TELEPHONE (OUTPATIENT)
Dept: GYNECOLOGY | Facility: CLINIC | Age: 40
End: 2025-02-18
Payer: COMMERCIAL

## 2025-02-18 NOTE — TELEPHONE ENCOUNTER
Attempted to call patient regarding appointment change with available accommodations.  Voicemail box is full and cannot leave voicemail.  Message via portal.

## 2025-02-19 ENCOUNTER — TELEMEDICINE (OUTPATIENT)
Dept: GYNECOLOGY | Facility: CLINIC | Age: 40
End: 2025-02-19
Payer: COMMERCIAL

## 2025-02-19 DIAGNOSIS — K59.02 SPASTIC PELVIC FLOOR SYNDROME: ICD-10-CM

## 2025-02-19 DIAGNOSIS — R39.15 URINARY URGENCY: Primary | ICD-10-CM

## 2025-02-19 NOTE — PROGRESS NOTES
Patient ID: Celine Engle                              : 1985  MRN: [id number]                  Celine Engle and I are about to have a MY CHART VIDEO telemedicine check-in or visit. This is allowed because you are already my patient, and you have requested it.   This telemedicine visit will be billed to your health insurance or you, if you are self-insured.  You understand you will be responsible for any copayments or coinsurances that apply to your telemedicine visit.  Before starting our telemedicine visit, I am required to get your consent for this virtual check-in or visit by telemedicine. Do you consent?  [x]yes     []no  Verification of Patient Location: The patient affirms they are currently located in the following state:[place]                                         Consent obtained from patient and all parties present in the room? yes    I have obtained the consent of everyone present on the call to make an audio recording of this visit to assist me in documenting the encounter in the EMR.     VISIT DATE: 2025    CHIEF COMPLAINT: No chief complaint on file.      HISTORY OF PRESENT ILLNESS:  History of Present Illness  The patient presents via virtual visit for evaluation of urinary urgency.    Urinary Urgency  She is seeking a gynecologist with expertise in urology, as she perceives a correlation between her urinary symptoms and her menstrual cycle. Currently, she reports no urological issues but wishes to establish care due to the episodic nature of her symptoms. She has a history of severe urinary urgency, which was previously managed by [facility] and Dr. Katerina Lamas at [place]. Despite undergoing pelvic imaging, the cause of her symptoms remained uncertain, with interstitial cystitis being a potential diagnosis. She experienced periods of intense urinary urgency, disrupting her sleep and daily activities. The onset of these symptoms coincided with her  participation in the [employer] [place] in 05/2022. Although the initial culture was lost, subsequent cultures over the next 6 months were negative. Her gynecologist hypothesized that the symptoms could be due to a kidney infection or UTI, potentially triggered by the race. She noticed a pattern of symptom flare-ups every few weeks, often preceding her menstrual cycle, leading her to believe they were hormonally driven. Interestingly, her symptoms subsided during her pregnancy and did not return until 12/2024. She has a history of 6 UTIs, all of which were culture-positive. In 12/2024, she developed a UTI following intercourse, which was confirmed by culture and sensitivity testing. Despite treatment with Macrobid, her symptoms worsened, leading to an ER visit where she was diagnosed with interstitial cystitis. Her symptoms resolved after 2 to 3 weeks.  - Onset: Symptoms coincided with participation in the [employer] in 05/2022; flare-ups often precede her menstrual cycle; symptoms subsided during pregnancy and returned in 12/2024.  - Location: Urinary tract.  - Duration: Episodic nature with flare-ups every few weeks; symptoms resolved after 2 to 3 weeks post-ER visit.  - Character: Severe urinary urgency, disrupting sleep and daily activities; no vaginal discharge, itching, or burning; no lower back or abdominal pain except left lower back pain when Macrobid was ineffective.  - Alleviating/Aggravating Factors: Symptoms potentially triggered by race; worsened despite treatment with Macrobid; subsided during pregnancy.  - Timing: Episodic with flare-ups every few weeks, often preceding menstrual cycle.  - Severity: Severe enough to disrupt sleep and daily activities; led to ER visit and diagnosis of interstitial cystitis.    She reports no vaginal discharge, itching, or burning. She has been  since 2005 and reports no issues with penetrative sex. She also reports no lower back or abdominal pain, although  she did experience left lower back pain when Macrobid was ineffective. She is not a long-distance runner and has limited her running to 4 miles due to fear of symptom recurrence. She was prescribed pelvic floor physical therapy in the summer of 2022 but did not pursue it due to a 6-month waiting list. She recently completed her menstrual cycle and has a regular 28-day cycle. She was previously treated with Myrbetriq and underwent pelvic imaging, which revealed no abnormalities such as kidney stones. A cystoscopy was planned but postponed due to her pregnancy. She has a history of 6 UTIs, all of which were culture-positive. In 12/2024, she developed a UTI following intercourse, which was confirmed by culture and sensitivity testing. Despite treatment with Macrobid, her symptoms worsened, leading to an ER visit where she was diagnosed with interstitial cystitis. Her symptoms resolved after 2 to 3 weeks.    MEDICATIONS  - Myrbetriq (past)  - Macrobid (past)  - Fluconazole (past)      PAST MEDICAL HISTORY:  has a past medical history of Anxiety, Chiari malformation type I (CMS/[facility]), H/O migraine, Herpes, Ovarian cyst (2012), and UTI (urinary tract infection).     PAST SURGICAL HISTORY:  has a past surgical history that includes [place] tooth extraction and Dilation and curettage, diagnostic / therapeutic.    SOCIAL HISTORY:   Social History     Tobacco Use    Smoking status: Never    Smokeless tobacco: Never   Vaping Use    Vaping status: Never Used   Substance Use Topics    Alcohol use: Not Currently    Drug use: Never       FAMILY HISTORY: family history is not on file.    MEDICATIONS:   Current Outpatient Medications:     prenatal vit no.130-iron-folic 27 mg iron- 800 mcg tablet tablet, Take 1 tablet by mouth daily., Disp: , Rfl:     ALLERGIES: has No Known Allergies.     REVIEW OF SYSTEMS:  All other systems reviewed and negative except as noted in the HPI.    Exam limited through telemedicine  PHYSICAL EXAM:  There  were no vitals taken for this visit.  GENERAL APPEARANCE: Well appearing, well nourished and well developed.  Not in acute distress.  Appears stated age.  HEAD: Normocephalic, atraumatic.  EYES:  Sclerae white. Conjunctivae clear.  NECK:  Neck was supple.  EXTREMITIES: No clubbing, no cyanosis nor edema.  SKIN:  Dry, intact. No rashes noted.  PSYCHIATRIC: Calm and cooperative with appropriate insight      Assessment and Plan:     Assessment & Plan  1. Urinary urgency: Stable.  History and symptoms c/w pelvic floor spasm worsened by infection and hormonal cycling changes.   - Referral for pelvic floor physical therapy provided.  - Advised to undergo pelvic floor physical therapy even when asymptomatic to prevent future flare-ups.  - Order for a urine culture placed.  - Advised to inform if experiencing any symptoms.  - Discuss the use of vaginal estrogen with gynecologist to reduce infection risk and aid in healing post-infection.  - If experiencing another UTI post-intercourse, can consider taking a single dose of nitrofurantoin (Macrobid) prophylactically.    Follow-up  - Schedule an appointment a day or two before the next menstrual cycle, which can be canceled if not experiencing symptoms.           Visit Diagnosis  Urinary urgency  (primary encounter diagnosis)  Plan: Ambulatory Referral to Pelvic Floor Rehab         (Physical Therapy), Urine culture, Urine         culture    Spastic pelvic floor syndrome  Plan: Ambulatory Referral to Pelvic Floor Rehab         (Physical Therapy)       I spent  40 minutes on this date of service performing the following activities: obtaining history, entering orders, documenting, and providing counseling and education.     Janna Cotter MD

## 2025-03-28 ENCOUNTER — TRANSCRIBE ORDERS (OUTPATIENT)
Dept: RADIOLOGY | Age: 40
End: 2025-03-28

## 2025-03-28 DIAGNOSIS — Z12.31 ENCOUNTER FOR SCREENING MAMMOGRAM FOR MALIGNANT NEOPLASM OF BREAST: Primary | ICD-10-CM

## 2025-04-03 ENCOUNTER — HOSPITAL ENCOUNTER (OUTPATIENT)
Dept: RADIOLOGY | Age: 40
Discharge: HOME | End: 2025-04-03
Attending: FAMILY MEDICINE | Admitting: FAMILY MEDICINE
Payer: COMMERCIAL

## 2025-04-03 DIAGNOSIS — Z12.31 ENCOUNTER FOR SCREENING MAMMOGRAM FOR MALIGNANT NEOPLASM OF BREAST: ICD-10-CM

## 2025-04-03 PROCEDURE — 77063 BREAST TOMOSYNTHESIS BI: CPT

## (undated) DEVICE — PACK LITHOTOMY VI

## (undated) DEVICE — Device

## (undated) DEVICE — GOWN SURGICAL REINFORCED X-LAR

## (undated) DEVICE — SOLN IRRIG .9%SOD 500ML

## (undated) DEVICE — TUBING DE SMALL FOR 8-14MM VACURETTE

## (undated) DEVICE — CANISTER W/TOP DE BERKELEY LG

## (undated) DEVICE — CANISTER DE BERKELEY W/TISSUETRAP

## (undated) DEVICE — BLANKET UPPER BODY BAIR HUGGER

## (undated) DEVICE — LITE GLOVE 1 HANDLE COVER

## (undated) DEVICE — SACK GAUZE

## (undated) DEVICE — GLOVE SZ 6 PROTEXIS PI

## (undated) DEVICE — ***USE 141034*** PAD PREPPING

## (undated) DEVICE — GAUZE 4X4 16 PLY RFID DOUBLE XRAY